# Patient Record
Sex: FEMALE | Race: OTHER | HISPANIC OR LATINO
[De-identification: names, ages, dates, MRNs, and addresses within clinical notes are randomized per-mention and may not be internally consistent; named-entity substitution may affect disease eponyms.]

---

## 2017-01-10 ENCOUNTER — MEDICATION RENEWAL (OUTPATIENT)
Age: 82
End: 2017-01-10

## 2017-02-09 ENCOUNTER — MEDICATION RENEWAL (OUTPATIENT)
Age: 82
End: 2017-02-09

## 2017-03-08 ENCOUNTER — MEDICATION RENEWAL (OUTPATIENT)
Age: 82
End: 2017-03-08

## 2017-03-10 ENCOUNTER — RX RENEWAL (OUTPATIENT)
Age: 82
End: 2017-03-10

## 2017-03-28 ENCOUNTER — APPOINTMENT (OUTPATIENT)
Dept: INTERNAL MEDICINE | Facility: CLINIC | Age: 82
End: 2017-03-28

## 2017-03-28 VITALS
TEMPERATURE: 98.2 F | HEIGHT: 65 IN | HEART RATE: 75 BPM | OXYGEN SATURATION: 96 % | DIASTOLIC BLOOD PRESSURE: 80 MMHG | SYSTOLIC BLOOD PRESSURE: 122 MMHG

## 2017-03-28 DIAGNOSIS — R42 DIZZINESS AND GIDDINESS: ICD-10-CM

## 2017-03-28 DIAGNOSIS — R06.02 SHORTNESS OF BREATH: ICD-10-CM

## 2017-03-28 DIAGNOSIS — D64.9 ANEMIA, UNSPECIFIED: ICD-10-CM

## 2017-03-28 DIAGNOSIS — I10 ESSENTIAL (PRIMARY) HYPERTENSION: ICD-10-CM

## 2017-03-28 DIAGNOSIS — E03.9 HYPOTHYROIDISM, UNSPECIFIED: ICD-10-CM

## 2017-03-29 LAB
ANION GAP SERPL CALC-SCNC: 16 MMOL/L
BASOPHILS # BLD AUTO: 0.03 K/UL
BASOPHILS NFR BLD AUTO: 0.3 %
BUN SERPL-MCNC: 17 MG/DL
CALCIUM SERPL-MCNC: 9.5 MG/DL
CHLORIDE SERPL-SCNC: 107 MMOL/L
CO2 SERPL-SCNC: 24 MMOL/L
CREAT SERPL-MCNC: 0.88 MG/DL
EOSINOPHIL # BLD AUTO: 0.12 K/UL
EOSINOPHIL NFR BLD AUTO: 1.1 %
GLUCOSE SERPL-MCNC: 148 MG/DL
HCT VFR BLD CALC: 42.6 %
HGB BLD-MCNC: 13.5 G/DL
IMM GRANULOCYTES NFR BLD AUTO: 0.2 %
LYMPHOCYTES # BLD AUTO: 1.55 K/UL
LYMPHOCYTES NFR BLD AUTO: 14 %
MAN DIFF?: NORMAL
MCHC RBC-ENTMCNC: 31.7 GM/DL
MCHC RBC-ENTMCNC: 31.9 PG
MCV RBC AUTO: 100.7 FL
MONOCYTES # BLD AUTO: 0.68 K/UL
MONOCYTES NFR BLD AUTO: 6.1 %
NEUTROPHILS # BLD AUTO: 8.71 K/UL
NEUTROPHILS NFR BLD AUTO: 78.3 %
PLATELET # BLD AUTO: 288 K/UL
POTASSIUM SERPL-SCNC: 4.5 MMOL/L
RBC # BLD: 4.23 M/UL
RBC # FLD: 13.9 %
SODIUM SERPL-SCNC: 147 MMOL/L
TSH SERPL-ACNC: 1 UIU/ML
WBC # FLD AUTO: 11.11 K/UL

## 2017-04-07 ENCOUNTER — RX RENEWAL (OUTPATIENT)
Age: 82
End: 2017-04-07

## 2017-04-12 ENCOUNTER — MEDICATION RENEWAL (OUTPATIENT)
Age: 82
End: 2017-04-12

## 2017-07-12 ENCOUNTER — MEDICATION RENEWAL (OUTPATIENT)
Age: 82
End: 2017-07-12

## 2017-10-11 ENCOUNTER — MEDICATION RENEWAL (OUTPATIENT)
Age: 82
End: 2017-10-11

## 2017-10-18 ENCOUNTER — APPOINTMENT (OUTPATIENT)
Dept: INTERNAL MEDICINE | Facility: CLINIC | Age: 82
End: 2017-10-18

## 2018-03-29 ENCOUNTER — INPATIENT (INPATIENT)
Facility: HOSPITAL | Age: 83
LOS: 7 days | Discharge: HOSPICE | DRG: 871 | End: 2018-04-06
Attending: HOSPITALIST | Admitting: INTERNAL MEDICINE
Payer: MEDICARE

## 2018-03-29 VITALS
SYSTOLIC BLOOD PRESSURE: 114 MMHG | DIASTOLIC BLOOD PRESSURE: 70 MMHG | RESPIRATION RATE: 18 BRPM | HEART RATE: 122 BPM | OXYGEN SATURATION: 94 %

## 2018-03-29 DIAGNOSIS — A41.9 SEPSIS, UNSPECIFIED ORGANISM: ICD-10-CM

## 2018-03-29 DIAGNOSIS — J18.1 LOBAR PNEUMONIA, UNSPECIFIED ORGANISM: ICD-10-CM

## 2018-03-29 DIAGNOSIS — F03.90 UNSPECIFIED DEMENTIA WITHOUT BEHAVIORAL DISTURBANCE: ICD-10-CM

## 2018-03-29 DIAGNOSIS — N17.9 ACUTE KIDNEY FAILURE, UNSPECIFIED: ICD-10-CM

## 2018-03-29 DIAGNOSIS — N39.0 URINARY TRACT INFECTION, SITE NOT SPECIFIED: ICD-10-CM

## 2018-03-29 DIAGNOSIS — R73.9 HYPERGLYCEMIA, UNSPECIFIED: ICD-10-CM

## 2018-03-29 DIAGNOSIS — G93.40 ENCEPHALOPATHY, UNSPECIFIED: ICD-10-CM

## 2018-03-29 DIAGNOSIS — E87.0 HYPEROSMOLALITY AND HYPERNATREMIA: ICD-10-CM

## 2018-03-29 DIAGNOSIS — R91.8 OTHER NONSPECIFIC ABNORMAL FINDING OF LUNG FIELD: ICD-10-CM

## 2018-03-29 DIAGNOSIS — R50.9 FEVER, UNSPECIFIED: ICD-10-CM

## 2018-03-29 LAB
ALBUMIN SERPL ELPH-MCNC: 3.1 G/DL — LOW (ref 3.3–5.2)
ALBUMIN SERPL ELPH-MCNC: 3.5 G/DL — SIGNIFICANT CHANGE UP (ref 3.3–5.2)
ALP SERPL-CCNC: 56 U/L — SIGNIFICANT CHANGE UP (ref 40–120)
ALP SERPL-CCNC: 64 U/L — SIGNIFICANT CHANGE UP (ref 40–120)
ALT FLD-CCNC: 40 U/L — HIGH
ALT FLD-CCNC: 46 U/L — HIGH
ANION GAP SERPL CALC-SCNC: 18 MMOL/L — HIGH (ref 5–17)
ANION GAP SERPL CALC-SCNC: 22 MMOL/L — HIGH (ref 5–17)
ANION GAP SERPL CALC-SCNC: 22 MMOL/L — HIGH (ref 5–17)
ANISOCYTOSIS BLD QL: SLIGHT — SIGNIFICANT CHANGE UP
APPEARANCE UR: CLEAR — SIGNIFICANT CHANGE UP
APTT BLD: 24.8 SEC — LOW (ref 27.5–37.4)
AST SERPL-CCNC: 69 U/L — HIGH
AST SERPL-CCNC: 81 U/L — HIGH
BACTERIA # UR AUTO: ABNORMAL
BILIRUB SERPL-MCNC: 0.8 MG/DL — SIGNIFICANT CHANGE UP (ref 0.4–2)
BILIRUB SERPL-MCNC: 1 MG/DL — SIGNIFICANT CHANGE UP (ref 0.4–2)
BILIRUB UR-MCNC: ABNORMAL
BUN SERPL-MCNC: 58 MG/DL — HIGH (ref 8–20)
BUN SERPL-MCNC: 63 MG/DL — HIGH (ref 8–20)
BUN SERPL-MCNC: 66 MG/DL — HIGH (ref 8–20)
CALCIUM SERPL-MCNC: 8.2 MG/DL — LOW (ref 8.6–10.2)
CALCIUM SERPL-MCNC: 8.8 MG/DL — SIGNIFICANT CHANGE UP (ref 8.6–10.2)
CALCIUM SERPL-MCNC: 9.8 MG/DL — SIGNIFICANT CHANGE UP (ref 8.6–10.2)
CHLORIDE SERPL-SCNC: 118 MMOL/L — HIGH (ref 98–107)
CHLORIDE SERPL-SCNC: 121 MMOL/L — HIGH (ref 98–107)
CHLORIDE SERPL-SCNC: 121 MMOL/L — HIGH (ref 98–107)
CO2 SERPL-SCNC: 20 MMOL/L — LOW (ref 22–29)
CO2 SERPL-SCNC: 23 MMOL/L — SIGNIFICANT CHANGE UP (ref 22–29)
CO2 SERPL-SCNC: 26 MMOL/L — SIGNIFICANT CHANGE UP (ref 22–29)
COLOR SPEC: YELLOW — SIGNIFICANT CHANGE UP
CREAT SERPL-MCNC: 2.63 MG/DL — HIGH (ref 0.5–1.3)
CREAT SERPL-MCNC: 2.67 MG/DL — HIGH (ref 0.5–1.3)
CREAT SERPL-MCNC: 2.92 MG/DL — HIGH (ref 0.5–1.3)
DIFF PNL FLD: ABNORMAL
EPI CELLS # UR: SIGNIFICANT CHANGE UP
GLUCOSE SERPL-MCNC: 142 MG/DL — HIGH (ref 70–115)
GLUCOSE SERPL-MCNC: 150 MG/DL — HIGH (ref 70–115)
GLUCOSE SERPL-MCNC: 232 MG/DL — HIGH (ref 70–115)
GLUCOSE UR QL: 50 MG/DL
HCT VFR BLD CALC: 46.9 % — SIGNIFICANT CHANGE UP (ref 37–47)
HGB BLD-MCNC: 15.6 G/DL — SIGNIFICANT CHANGE UP (ref 12–16)
HYPOCHROMIA BLD QL: SLIGHT — SIGNIFICANT CHANGE UP
INR BLD: 1.76 RATIO — HIGH (ref 0.88–1.16)
KETONES UR-MCNC: ABNORMAL
LACTATE BLDV-MCNC: 2.5 MMOL/L — HIGH (ref 0.5–2)
LEUKOCYTE ESTERASE UR-ACNC: ABNORMAL
LYMPHOCYTES # BLD AUTO: 11 % — LOW (ref 20–55)
MACROCYTES BLD QL: SLIGHT — SIGNIFICANT CHANGE UP
MAGNESIUM SERPL-MCNC: 2.1 MG/DL — SIGNIFICANT CHANGE UP (ref 1.6–2.6)
MCHC RBC-ENTMCNC: 33.3 G/DL — SIGNIFICANT CHANGE UP (ref 32–36)
MCHC RBC-ENTMCNC: 33.5 PG — HIGH (ref 27–31)
MCV RBC AUTO: 100.6 FL — HIGH (ref 81–99)
MICROCYTES BLD QL: SLIGHT — SIGNIFICANT CHANGE UP
MONOCYTES NFR BLD AUTO: 6 % — SIGNIFICANT CHANGE UP (ref 3–10)
NEUTROPHILS NFR BLD AUTO: 83 % — HIGH (ref 37–73)
NITRITE UR-MCNC: POSITIVE
OVALOCYTES BLD QL SMEAR: SLIGHT — SIGNIFICANT CHANGE UP
PH UR: 5 — SIGNIFICANT CHANGE UP (ref 5–8)
PHOSPHATE SERPL-MCNC: 3.6 MG/DL — SIGNIFICANT CHANGE UP (ref 2.4–4.7)
PLAT MORPH BLD: NORMAL — SIGNIFICANT CHANGE UP
PLATELET # BLD AUTO: 373 K/UL — SIGNIFICANT CHANGE UP (ref 150–400)
POIKILOCYTOSIS BLD QL AUTO: SLIGHT — SIGNIFICANT CHANGE UP
POTASSIUM SERPL-MCNC: 3.5 MMOL/L — SIGNIFICANT CHANGE UP (ref 3.5–5.3)
POTASSIUM SERPL-MCNC: 3.6 MMOL/L — SIGNIFICANT CHANGE UP (ref 3.5–5.3)
POTASSIUM SERPL-MCNC: 4.9 MMOL/L — SIGNIFICANT CHANGE UP (ref 3.5–5.3)
POTASSIUM SERPL-SCNC: 3.5 MMOL/L — SIGNIFICANT CHANGE UP (ref 3.5–5.3)
POTASSIUM SERPL-SCNC: 3.6 MMOL/L — SIGNIFICANT CHANGE UP (ref 3.5–5.3)
POTASSIUM SERPL-SCNC: 4.9 MMOL/L — SIGNIFICANT CHANGE UP (ref 3.5–5.3)
PROT SERPL-MCNC: 6.4 G/DL — LOW (ref 6.6–8.7)
PROT SERPL-MCNC: 7.5 G/DL — SIGNIFICANT CHANGE UP (ref 6.6–8.7)
PROT UR-MCNC: 30 MG/DL
PROTHROM AB SERPL-ACNC: 19.6 SEC — HIGH (ref 9.8–12.7)
RBC # BLD: 4.66 M/UL — SIGNIFICANT CHANGE UP (ref 4.4–5.2)
RBC # FLD: 14.7 % — SIGNIFICANT CHANGE UP (ref 11–15.6)
RBC BLD AUTO: ABNORMAL
RBC CASTS # UR COMP ASSIST: SIGNIFICANT CHANGE UP /HPF (ref 0–4)
SODIUM SERPL-SCNC: 162 MMOL/L — CRITICAL HIGH (ref 135–145)
SODIUM SERPL-SCNC: 163 MMOL/L — CRITICAL HIGH (ref 135–145)
SODIUM SERPL-SCNC: 166 MMOL/L — CRITICAL HIGH (ref 135–145)
SP GR SPEC: 1.02 — SIGNIFICANT CHANGE UP (ref 1.01–1.02)
UROBILINOGEN FLD QL: 4 MG/DL
WBC # BLD: 19.8 K/UL — HIGH (ref 4.8–10.8)
WBC # FLD AUTO: 19.8 K/UL — HIGH (ref 4.8–10.8)
WBC UR QL: SIGNIFICANT CHANGE UP

## 2018-03-29 PROCEDURE — 71045 X-RAY EXAM CHEST 1 VIEW: CPT | Mod: 26

## 2018-03-29 PROCEDURE — 93010 ELECTROCARDIOGRAM REPORT: CPT

## 2018-03-29 PROCEDURE — 99223 1ST HOSP IP/OBS HIGH 75: CPT

## 2018-03-29 PROCEDURE — 99285 EMERGENCY DEPT VISIT HI MDM: CPT

## 2018-03-29 RX ORDER — PIPERACILLIN AND TAZOBACTAM 4; .5 G/20ML; G/20ML
3.38 INJECTION, POWDER, LYOPHILIZED, FOR SOLUTION INTRAVENOUS EVERY 12 HOURS
Qty: 0 | Refills: 0 | Status: COMPLETED | OUTPATIENT
Start: 2018-03-29 | End: 2018-04-04

## 2018-03-29 RX ORDER — AZITHROMYCIN 500 MG/1
500 TABLET, FILM COATED ORAL ONCE
Qty: 0 | Refills: 0 | Status: COMPLETED | OUTPATIENT
Start: 2018-03-29 | End: 2018-03-29

## 2018-03-29 RX ORDER — SODIUM CHLORIDE 9 MG/ML
1000 INJECTION, SOLUTION INTRAVENOUS
Qty: 0 | Refills: 0 | Status: DISCONTINUED | OUTPATIENT
Start: 2018-03-29 | End: 2018-03-30

## 2018-03-29 RX ORDER — LEVOTHYROXINE SODIUM 125 MCG
12.5 TABLET ORAL DAILY
Qty: 0 | Refills: 0 | Status: DISCONTINUED | OUTPATIENT
Start: 2018-03-29 | End: 2018-04-06

## 2018-03-29 RX ORDER — BUDESONIDE AND FORMOTEROL FUMARATE DIHYDRATE 160; 4.5 UG/1; UG/1
2 AEROSOL RESPIRATORY (INHALATION)
Qty: 0 | Refills: 0 | Status: DISCONTINUED | OUTPATIENT
Start: 2018-03-29 | End: 2018-03-29

## 2018-03-29 RX ORDER — SODIUM CHLORIDE 9 MG/ML
1000 INJECTION, SOLUTION INTRAVENOUS
Qty: 0 | Refills: 0 | Status: DISCONTINUED | OUTPATIENT
Start: 2018-03-29 | End: 2018-03-29

## 2018-03-29 RX ORDER — PIPERACILLIN AND TAZOBACTAM 4; .5 G/20ML; G/20ML
3.38 INJECTION, POWDER, LYOPHILIZED, FOR SOLUTION INTRAVENOUS ONCE
Qty: 0 | Refills: 0 | Status: COMPLETED | OUTPATIENT
Start: 2018-03-29 | End: 2018-03-29

## 2018-03-29 RX ORDER — ENOXAPARIN SODIUM 100 MG/ML
30 INJECTION SUBCUTANEOUS DAILY
Qty: 0 | Refills: 0 | Status: DISCONTINUED | OUTPATIENT
Start: 2018-03-29 | End: 2018-03-29

## 2018-03-29 RX ORDER — INSULIN LISPRO 100/ML
VIAL (ML) SUBCUTANEOUS EVERY 6 HOURS
Qty: 0 | Refills: 0 | Status: DISCONTINUED | OUTPATIENT
Start: 2018-03-29 | End: 2018-04-04

## 2018-03-29 RX ORDER — ACETAMINOPHEN 500 MG
650 TABLET ORAL ONCE
Qty: 0 | Refills: 0 | Status: COMPLETED | OUTPATIENT
Start: 2018-03-29 | End: 2018-03-29

## 2018-03-29 RX ORDER — ASPIRIN/CALCIUM CARB/MAGNESIUM 324 MG
81 TABLET ORAL DAILY
Qty: 0 | Refills: 0 | Status: DISCONTINUED | OUTPATIENT
Start: 2018-03-29 | End: 2018-04-06

## 2018-03-29 RX ORDER — SODIUM CHLORIDE 9 MG/ML
2000 INJECTION INTRAMUSCULAR; INTRAVENOUS; SUBCUTANEOUS ONCE
Qty: 0 | Refills: 0 | Status: COMPLETED | OUTPATIENT
Start: 2018-03-29 | End: 2018-03-29

## 2018-03-29 RX ORDER — ALBUTEROL 90 UG/1
2.5 AEROSOL, METERED ORAL EVERY 6 HOURS
Qty: 0 | Refills: 0 | Status: DISCONTINUED | OUTPATIENT
Start: 2018-03-29 | End: 2018-04-02

## 2018-03-29 RX ORDER — SODIUM CHLORIDE 9 MG/ML
1000 INJECTION INTRAMUSCULAR; INTRAVENOUS; SUBCUTANEOUS ONCE
Qty: 0 | Refills: 0 | Status: COMPLETED | OUTPATIENT
Start: 2018-03-29 | End: 2018-03-29

## 2018-03-29 RX ORDER — QUETIAPINE FUMARATE 200 MG/1
25 TABLET, FILM COATED ORAL AT BEDTIME
Qty: 0 | Refills: 0 | Status: DISCONTINUED | OUTPATIENT
Start: 2018-03-29 | End: 2018-04-06

## 2018-03-29 RX ORDER — HEPARIN SODIUM 5000 [USP'U]/ML
5000 INJECTION INTRAVENOUS; SUBCUTANEOUS EVERY 12 HOURS
Qty: 0 | Refills: 0 | Status: DISCONTINUED | OUTPATIENT
Start: 2018-03-29 | End: 2018-04-02

## 2018-03-29 RX ORDER — HALOPERIDOL DECANOATE 100 MG/ML
3 INJECTION INTRAMUSCULAR ONCE
Qty: 0 | Refills: 0 | Status: COMPLETED | OUTPATIENT
Start: 2018-03-29 | End: 2018-03-29

## 2018-03-29 RX ADMIN — AZITHROMYCIN 255 MILLIGRAM(S): 500 TABLET, FILM COATED ORAL at 16:30

## 2018-03-29 RX ADMIN — PIPERACILLIN AND TAZOBACTAM 200 GRAM(S): 4; .5 INJECTION, POWDER, LYOPHILIZED, FOR SOLUTION INTRAVENOUS at 11:57

## 2018-03-29 RX ADMIN — SODIUM CHLORIDE 2000 MILLILITER(S): 9 INJECTION INTRAMUSCULAR; INTRAVENOUS; SUBCUTANEOUS at 11:57

## 2018-03-29 RX ADMIN — SODIUM CHLORIDE 1000 MILLILITER(S): 9 INJECTION INTRAMUSCULAR; INTRAVENOUS; SUBCUTANEOUS at 14:08

## 2018-03-29 RX ADMIN — Medication 650 MILLIGRAM(S): at 10:48

## 2018-03-29 RX ADMIN — HALOPERIDOL DECANOATE 3 MILLIGRAM(S): 100 INJECTION INTRAMUSCULAR at 14:08

## 2018-03-29 RX ADMIN — PIPERACILLIN AND TAZOBACTAM 25 GRAM(S): 4; .5 INJECTION, POWDER, LYOPHILIZED, FOR SOLUTION INTRAVENOUS at 19:48

## 2018-03-29 RX ADMIN — SODIUM CHLORIDE 80 MILLILITER(S): 9 INJECTION, SOLUTION INTRAVENOUS at 17:16

## 2018-03-29 RX ADMIN — SODIUM CHLORIDE 80 MILLILITER(S): 9 INJECTION, SOLUTION INTRAVENOUS at 20:00

## 2018-03-29 NOTE — ED ADULT NURSE NOTE - OBJECTIVE STATEMENT
family states that she has ams for several days. pt was treated for UTI 3 days ago. pt was very confused and agitated this this am. family states that she has ams for several days. pt was treated for UTI 3 days ago. pt was very confused and agitated this  am. pt also has become incont of urine which she never was. pt has multiply skin tears to bilaterally arms on arrival

## 2018-03-29 NOTE — ED PROVIDER NOTE - MUSCULOSKELETAL, MLM
Spine appears normal, range of motion is not limited, no muscle or joint tenderness. Moving all extremities. No LE edema.

## 2018-03-29 NOTE — H&P ADULT - NSHPPHYSICALEXAM_GEN_ALL_CORE
Vital Signs Last 24 Hrs  T(C): 36.6 (29 Mar 2018 14:42), Max: 38.9 (29 Mar 2018 10:25)  T(F): 97.9 (29 Mar 2018 14:42), Max: 102.1 (29 Mar 2018 10:25)  HR: 123 (29 Mar 2018 11:05) (122 - 134)  BP: 105/55 (29 Mar 2018 11:05) (105/55 - 125/75)  BP(mean): 79 (29 Mar 2018 11:05) (79 - 96)  RR: 22 (29 Mar 2018 11:05) (18 - 22)  SpO2: 100% (29 Mar 2018 11:05) (94% - 100%)    GENERAL: Not in distress. drowsy. agitated, follows verbal command intermittently. limited exam   HEENT:  Normocephalic and atraumatic. PEARLA,  NECK: Supple.  No JVD.    CARDIOVASCULAR: RRR S1, S2. No murmur/rubs/gallop  LUNGS: BLAE+, + rales, no wheezing, no rhonchi.    ABDOMEN: ND. Soft,  NT, no guarding / rebound / rigidity. BS normoactive. No CVA tenderness.    BACK: No spine tenderness.  EXTREMITIES: no cyanosis, no clubbing, no edema.   SKIN: no rash. multiple small skin break with superficial old blood. No cellulitis.  NEUROLOGIC: confused, drowsy, moving all limbs    PSYCHIATRIC: intermittent agitation however follows verbal redirection

## 2018-03-29 NOTE — ED PROVIDER NOTE - OBJECTIVE STATEMENT
This is a 89 y/o F PMHx dementia, DM, HTN and hypothyroidism BIBA to ED for altered mental status for the past three days. EMS states pt lives with family and as per them pt has been declining for the past three days. She was diagnosed with a UTI by a visiting at home nurse and failed out patient treatement with Macrobid. At baseline pt ambulates around with  walker on her own and is verbal. EMS found pt hypotensive, tachycardic and warm to the touch when they arrived. Pt is on Metoprolol, Lasix, Macrobid, Aspirin and Remeron. This is a 89 y/o F PMHx dementia, DM, HTN and hypothyroidism BIBA to ED for altered mental status for the past three days. EMS states pt lives with family and as per them pt has been declining for the past three days. She was diagnosed with a UTI by a visiting at home nurse and failed out patient treatement with Macrobid. At baseline pt ambulates around with  walker on her own and is verbal. EMS found pt hypotensive, tachycardic and warm to the touch when they arrived. Pt is on Metoprolol, Lasix, Macrobid, Aspirin and Remeron. As per he states pt is not diabetic, she is not on any diabetic medications. He also reports pt had blood drawn on the 21st, they received a call yesterday where they were informed pt had an elevated WBC. Three days ago pt also began to be incontinent which is abnormal for her. This is a 89 y/o F PMHx dementia, DM, HTN and hypothyroidism BIBA to ED for altered mental status for the past three days. EMS states pt lives with family and has been declining for the past three days after a UTI diagnosed by a visiting at home nurse. Pt was placed on Macrobid for UTI. At baseline pt is able to ambulate with a walker and is verbal. EMS found pt hypotensive, tachycardic and warm to the touch when they arrived. Pt is on Metoprolol, Lasix, Macrobid, Aspirin and Remeron. As per son he states pt is not diabetic, she is not on any diabetic medications. He also reports pt had blood drawn on the 21st, they received a call yesterday where they were informed pt had an elevated WBC. Three days ago pt also began to be incontinent which is abnormal for her.

## 2018-03-29 NOTE — ED PROVIDER NOTE - CARE PLAN
Principal Discharge DX:	Urinary tract infection without hematuria, site unspecified  Secondary Diagnosis:	Hypernatremia  Secondary Diagnosis:	Sepsis, due to unspecified organism

## 2018-03-29 NOTE — CONSULT NOTE ADULT - SUBJECTIVE AND OBJECTIVE BOX
Patient is a 89y old  Female who presents with a chief complaint of AMS, (29 Mar 2018 17:01)      BRIEF HOSPITAL COURSE: 89 y/o F with a h/o dementia, DM, HTN, hypothyroidism, recent UTI being treated with Macrobid, presents to ED today with AMS and poor PO intake. Patient found to be hypotensive, tachycardia, and febrile. UA (+). CXR suspicious for LLL infiltrate. Also noted to be in acute renal failure with profound hypernatremia (Na: 166). As per report, family states that although patient has h/o dementia, she is  able to converse and ambulate with help of a walker. Patient is now severely encephalopathic- writhing in bed, unresponsive to questions or commands, clawing at forearms and drawing blood. No seizure activity appreciated.      PAST MEDICAL & SURGICAL HISTORY:  Dementia with behavioral disturbance, unspecified dementia type  Depression  Hypothyroidism  DJD (degenerative joint disease)  Hypertension  H/O: hysterectomy    Allergies    No Known Allergies    Intolerances      FAMILY HISTORY:  No significant family history      Review of Systems: Unable to obtain secondary to mental status      Medications:  piperacillin/tazobactam IVPB. 3.375 Gram(s) IV Intermittent every 12 hours  ALBUTerol    0.083% 2.5 milliGRAM(s) Nebulizer every 6 hours PRN  QUEtiapine 25 milliGRAM(s) Oral at bedtime  aspirin  chewable 81 milliGRAM(s) Oral daily  heparin  Injectable 5000 Unit(s) SubCutaneous every 12 hours  levothyroxine 12.5 MICROGram(s) Oral daily  dextrose 5%. 1000 milliLiter(s) IV Continuous <Continuous>      ICU Vital Signs Last 24 Hrs  T(C): 36.6 (29 Mar 2018 21:04), Max: 38.9 (29 Mar 2018 10:25)  T(F): 97.8 (29 Mar 2018 21:04), Max: 102.1 (29 Mar 2018 10:25)  HR: 120 (29 Mar 2018 21:04) (120 - 134)  BP: 103/85 (29 Mar 2018 21:04) (103/85 - 125/75)  BP(mean): 79 (29 Mar 2018 11:05) (79 - 96)  ABP: --  ABP(mean): --  RR: 22 (29 Mar 2018 21:04) (18 - 22)  SpO2: 95% (29 Mar 2018 21:04) (94% - 100%)    Vital Signs Last 24 Hrs  T(C): 36.6 (29 Mar 2018 21:04), Max: 38.9 (29 Mar 2018 10:25)  T(F): 97.8 (29 Mar 2018 21:04), Max: 102.1 (29 Mar 2018 10:25)  HR: 120 (29 Mar 2018 21:04) (120 - 134)  BP: 103/85 (29 Mar 2018 21:04) (103/85 - 125/75)  BP(mean): 79 (29 Mar 2018 11:05) (79 - 96)  RR: 22 (29 Mar 2018 21:04) (18 - 22)  SpO2: 95% (29 Mar 2018 21:04) (94% - 100%)        I&O's Detail        LABS:                        15.6   19.8  )-----------( 373      ( 29 Mar 2018 10:43 )             46.9     03-29    162<HH>  |  121<H>  |  58.0<H>  ----------------------------<  232<H>  3.5   |  23.0  |  2.63<H>    Ca    8.2<L>      29 Mar 2018 19:03  Phos  3.6     -29  Mg     2.1     -29    TPro  6.4<L>  /  Alb  3.1<L>  /  TBili  1.0  /  DBili  x   /  AST  69<H>  /  ALT  40<H>  /  AlkPhos  56  03-29          CAPILLARY BLOOD GLUCOSE        PT/INR - ( 29 Mar 2018 19:03 )   PT: 19.6 sec;   INR: 1.76 ratio         PTT - ( 29 Mar 2018 19:03 )  PTT:24.8 sec  Urinalysis Basic - ( 29 Mar 2018 11:31 )    Color: Yellow / Appearance: Clear / S.025 / pH: x  Gluc: x / Ketone: Trace  / Bili: Moderate / Urobili: 4 mg/dL   Blood: x / Protein: 30 mg/dL / Nitrite: Positive   Leuk Esterase: Trace / RBC: 0-2 /HPF / WBC 0-2   Sq Epi: x / Non Sq Epi: Few / Bacteria: Moderate      CULTURES:      Physical Examination:    General: severe distress and encephalopathy, writhing in bed    HEENT: Pupils equal, reactive to light.  Symmetric.    PULM: Clear to auscultation bilaterally, no significant sputum production    CVS: tachycardic, regular rhythm, no murmurs, rubs, or gallops    ABD: Soft, nondistended, nontender, normoactive bowel sounds, no masses    EXT: No edema, nontender    SKIN: Warm and well perfused, no rashes noted.    NEURO: A&Ox0, severe encephalopathy, unresponsive to questions/commands, moving all extremities spontaneously      RADIOLOGY:     < from: Xray Chest 1 View-PORTABLE IMMEDIATE (18 @ 12:56) >  FINDINGS:   The lungs  show left lower lobe retrocardiac airspace consolidation   and/or mass. Left upper lobe and right lung parenchyma clear.       Follow-up chest radiograph and/or lateral radiograph recommended.  The heart and mediastinum are within normal limits.         Visualized osseous structures are intact.    IMPRESSION:   Left lower lobe retrocardiac airspace consolidation and/or   mass..          CRITICAL CARE TIME SPENT: 51 mins  Evaluating/treating patient, reviewing data/labs/imaging, discussing case with multidisciplinary team, discussing plan/goals of care with patient/family. Non-inclusive of procedure time.

## 2018-03-29 NOTE — CONSULT NOTE ADULT - SUBJECTIVE AND OBJECTIVE BOX
[facility]  =======================================================  Joon Gomez MD [facility]   Reese Solorio MD  Diplomates American Board of Internal Medicine and Infectious Diseases  =======================================================    [id number]  DARREL PHILLIP   This is a 89y  Female with dementia, DM, HTN and hypothyroidism, BIBA to ED for altered mental status for the past three days. Pt recently found with UTI by visiting RN and given a course of Macrobid.  patient now brought to the ER for evalation.   found febrile to 102. WBC elevation to 19k.  and hypernatremia to 166.    She is found with acute renal failure.  Her UA is also positive.   patient cannot contribute any history.   CXR shows infiltrate in the LLL / retrocardiac space.   Blood cx x 2 drawn; pt started on Zosyn by admitting team    =======================================================  Past Medical & Surgical Hx:  =====================  PAST MEDICAL & SURGICAL HISTORY:  Dementia with behavioral disturbance, unspecified dementia type  Depression  Hypothyroidism  DJD (degenerative joint disease)  Hypertension  H/O: hysterectomy      Problem List:  ==========  HEALTH ISSUES - PROBLEM Dx:          Social Hx:  =======  no toxic habits currently    Family History:  no significant family history of immunosuppressive disorders.  FAMILY HISTORY:  No significant family history         Allergies    No Known Allergies    Intolerances        MEDICATIONS  (STANDING):  aspirin  chewable 81 milliGRAM(s) Oral daily  buDESOnide 160 MICROgram(s)/formoterol 4.5 MICROgram(s) Inhaler 2 Puff(s) Inhalation two times a day  dextrose 5%. 1000 milliLiter(s) (80 mL/Hr) IV Continuous <Continuous>  enoxaparin Injectable 30 milliGRAM(s) SubCutaneous daily  levothyroxine 12.5 MICROGram(s) Oral daily  piperacillin/tazobactam IVPB. 3.375 Gram(s) IV Intermittent every 12 hours  QUEtiapine 25 milliGRAM(s) Oral at bedtime    MEDICATIONS  (PRN):  ALBUTerol    0.083% 2.5 milliGRAM(s) Nebulizer every 6 hours PRN SOB        =======================================================  REVIEW OF SYSTEMS:  UNABLE TO OBTAIN DUE TO MEDICAL STATUS    ======================================================  Physical Exam:  ============  Vital Signs Last 24 Hrs  T(C): 36.6 (29 Mar 2018 14:42), Max: 38.9 (29 Mar 2018 10:25)  T(F): 97.9 (29 Mar 2018 14:42), Max: 102.1 (29 Mar 2018 10:25)  HR: 123 (29 Mar 2018 11:05) (122 - 134)  BP: 105/55 (29 Mar 2018 11:05) (105/55 - 125/75)  BP(mean): 79 (29 Mar 2018 11:05) (79 - 96)  RR: 22 (29 Mar 2018 11:05) (18 - 22)  SpO2: 100% (29 Mar 2018 11:05) (94% - 100%)  Height (cm): 157.5 ( @ 15:48)  Weight (kg): 60 ( @ 15:48)  BMI (kg/m2): 24.2 ( @ 15:48)  BSA (m2): 1.6 ( @ 15:48)    General: THIN FRAIL LETHARGIC  Eye: Pupils are equal, round and reactive to light,   HENT: Normocephalic, Oral mucosa is VERY DRY, COBBLESTONING ON TONGUE  Neck: Supple,    Respiratory: COARSE UPPER AIRWAY RHONCHI, POOR BASE AERATION  Cardiovascular: Normal rate, TACHYCARDIA; NO EDEMA  Gastrointestinal: Soft, Non-distended, Normal bowel sounds.  Genitourinary: NO PIEDRA IN PLACE  Musculoskeletal: MOVES ALL EXTREMITIES  Integumentary: No rash.  Neurologic: Cranial Nerves II-XII are grossly intact. NON VERBAL CURRENTLY  Psychiatric: UNABLE TO ASSESS      =======================================================  Labs:  ====      163<HH>  |  121<H>  |  63.0<H>  ----------------------------<  150<H>  3.6   |  20.0<L>  |  2.67<H>    Ca    8.8      29 Mar 2018 13:47    TPro  6.4<L>  /  Alb  3.1<L>  /  TBili  1.0  /  DBili  x   /  AST  69<H>  /  ALT  40<H>  /  AlkPhos  56                            15.6   19.8  )-----------( 373      ( 29 Mar 2018 10:43 )             46.9         Urinalysis Basic - ( 29 Mar 2018 11:31 )    Color: Yellow / Appearance: Clear / S.025 / pH: x  Gluc: x / Ketone: Trace  / Bili: Moderate / Urobili: 4 mg/dL   Blood: x / Protein: 30 mg/dL / Nitrite: Positive   Leuk Esterase: Trace / RBC: 0-2 /HPF / WBC 0-2   Sq Epi: x / Non Sq Epi: Few / Bacteria: Moderate      LIVER FUNCTIONS - ( 29 Mar 2018 13:47 )  Alb: 3.1 g/dL / Pro: 6.4 g/dL / ALK PHOS: 56 U/L / ALT: 40 U/L / AST: 69 U/L / GGT: x

## 2018-03-29 NOTE — H&P ADULT - HISTORY OF PRESENT ILLNESS
This is a 89 y/o F PMHx dementia, DM, HTN and hypothyroidism, BIBA to ED for altered mental status for the past three days. EMS states pt lives with family and has been declining for the past three days after a UTI diagnosed by a visiting at home nurse. Pt was placed on Macrobid for UTI. At baseline pt is able to ambulate with a walker and is verbal. EMS found pt hypotensive, tachycardic and warm to the touch when they arrived. Pt is on Metoprolol, Lasix, Macrobid, Aspirin and Remeron. As per son he states pt is not diabetic, she is not on any diabetic medications. He also reports pt had blood drawn on the 21st, they received a call yesterday where they were informed pt had an elevated WBC. Three days ago pt also began to be incontinent which is abnormal for her. history verified from her son and NOK Mr. Hitesh Perera over phone who has been her caretaker for years. as per son, patient ambulates with walker at baseline and sometimes get agitated.she is currently not herself.

## 2018-03-29 NOTE — H&P ADULT - PMH
Dementia with behavioral disturbance, unspecified dementia type    Depression    DJD (degenerative joint disease)    Hypertension    Hypothyroidism

## 2018-03-29 NOTE — ED ADULT NURSE NOTE - ED STAT RN HANDOFF DETAILS
report given to janel gupta per policy, pt stable for tansport with 1:1 in plce on cm with , safety maintained

## 2018-03-29 NOTE — H&P ADULT - NSHPLABSRESULTS_GEN_ALL_CORE
15.6   19.8  )-----------( 373      ( 29 Mar 2018 10:43 )             46.9         03-29    163<HH>  |  121<H>  |  63.0<H>  ----------------------------<  150<H>  3.6   |  20.0<L>  |  2.67<H>    Ca    8.8      29 Mar 2018 13:47    TPro  6.4<L>  /  Alb  3.1<L>  /  TBili  1.0  /  DBili  x   /  AST  69<H>  /  ALT  40<H>  /  AlkPhos  56  03-29      < from: Xray Chest 1 View-PORTABLE IMMEDIATE (03.29.18 @ 12:56) >    IMPRESSION:   Left lower lobe retrocardiac airspace consolidation and/or   mass..          < end of copied text >

## 2018-03-29 NOTE — ED ADULT TRIAGE NOTE - CHIEF COMPLAINT QUOTE
pt was recently treated for a uti with macrobid  pt altered as per family code sepsis initaed upon arrival   dr sainz at bedside at 1019

## 2018-03-29 NOTE — ED ADULT NURSE NOTE - ED STAT RN HANDOFF WHERE
sicu bed 19, pt in sicu bed 19 when logistics called this rn to say pt was now going to MICU, pt to remain in sicu bed 19 per logistics

## 2018-03-29 NOTE — ED PROVIDER NOTE - UNABLE TO OBTAIN
History limited secondary to dementia and altered mental status. Dementia Unable to obtain review of systems secondary to dementia and altered mental status.

## 2018-03-29 NOTE — CONSULT NOTE ADULT - SUBJECTIVE AND OBJECTIVE BOX
History of Present Illness:  		          89 y/o female sent in from her rehab as her labs were abn. and she has not been eating or drinking well. pt's daughter is at bed side who is giving most of the history. pt. denies abd. pain. no n/v/d. pt. has been making urine. no cough/ phlegm. no fever in ED. no cp. no sob. pt. was recently discharged from [facility] after she was admitted for colitis. pt's cipro was stopped at Rehab after her Cr was found to be elevated.    Allergies/Medications:     	No Known Allergies:     Home Medications:     · 	metroNIDAZOLE 250 mg oral tablet: 1 tab(s) orally 3 times a day  · 	ciprofloxacin 250 mg oral tablet: 1 tab(s) orally 2 times a day  · 	hydrALAZINE: 25 milligram(s) orally 2 times a day  · 	ramipril 10 mg oral capsule:  orally   · 	traMADol:  orally   · 	amLODIPine 5 mg oral tablet:  orally   · 	metoprolol 50 mg oral tablet:  orally   · 	Synthroid 50 mcg (0.05 mg) oral tablet:  orally   · 	mirtazapine 30 mg oral tablet:  orally   · 	Dulera 5 mcg-200 mcg/inh inhalation aerosol:  inhaled       Past Medical History:  Depression    DJD (degenerative joint disease)    Hypertension    Hypothyroidism.    Past Surgical History:  H/O: hysterectomy.    Family History:  No significant family history.      Review of Systems:  · Additional ROS	ROS is negative except as in in HPI,	       Vital Signs Flow sheet:     Vital Signs Adult    20-Aug-2014 23:39	  Authored By: pca/gerald savage	  Temp (F) Temp (F): 98.1	  Temp (C) Temp (C): 36.7	  BP Systolic Systolic: 112	  BP Diastolic Diastolic (mm Hg): 65	  Respiration Rate (breaths/min) Respiration Rate (breaths/min): 16	  SpO2 (%) SpO2 (%): 99	  Patient On: supplemental O2	    Physical Exam:  · Constitutional	detailed exam	  · Constitutional Comments	elderly female lying in bed in NAD, appears dehydrated. Non Verbal, 	  · Eyes	EOMI; PERRL; no drainage or redness	  · ENMT	detailed exam	  · ENMT Comments	oral mucosa and lips appear dry otherwise unremarkable.	  · Neck	No bruits; no thyromegaly or nodules	  · Respiratory	Breath Sounds equal & clear to percussion & auscultation, no accessory muscle use	  · Cardiovascular	Regular rate & rhythm, normal S1, S2; no murmurs, gallops or rubs; no S3, S4	  · Gastrointestinal	Soft, non-tender, no hepatosplenomegaly, normal bowel sounds	  		  		  · Extremities	No cyanosis, clubbing or edema	  · Neurological	Lethargic,  no Focal defects,	  · Skin	detailed exam	  · Skin Details	warm and dry; no obvious rash noted,	      General Chemistry:	    20-Aug-2014 22:16, Comprehensive Metabolic Panel	  Sodium, Serum: 141, [136 - 145 mmol/L]	  Potassium, Serum: 4.3, [3.5 - 5.1 mmol/L], Moderate hemolysis.  Results may be falsely elevated.	  Chloride, Serum: 101, [98 - 107 mmol/L]	  Carbon Dioxide, Serum: 25.0, [22.0 - 29.0 mmol/L]	  Anion Gap, Serum: 15, [5 - 17 mmol/L]	  Blood Urea Nitrogen, Serum:    39.0, [8.0 - 20.0 mg/dL]	  Creatinine, Serum:    5.98, [0.50 - 1.50 mg/dL]	  Glucose, Serum: 108, [70 - 115 mg/dL]	  Calcium, Total Serum:    8.0, [8.6 - 10.2 mg/dL]	  Protein Total, Serum:    6.0, [6.6 - 8.7 g/dL]	  Albumin, Serum:    2.9, [3.3 - 5.2 g/dL]	  Bilirubin Total, Serum:    0.3, [0.4 - 2.0 mg/dL]	  Alkaline Phosphatase, Serum: 58, [30 - 120 U/L]	  Aspartate Aminotransferase (AST/SGOT):    35, [ - <=31 U/L]	  Alanine Aminotransferase (ALT/SGPT): 8, [ - <=32 U/L]	    20-Aug-2014 22:16, Magnesium, Serum	  Magnesium, Serum: 2.2, [1.8 - 2.5 mg/dL], Moderate hemolysis.  Results may be falsely elevated.	    20-Aug-2014 22:16, Phosphorus Level, Serum	  Phosphorus Level, Serum: 4.1, [2.4 - 4.7 mg/dL]	  Hematology:	    20-Aug-2014 20:47, Complete Blood Count + Automated Diff	  WBC Count:    15.74, [4.80 - 10.80 K/uL]	  RBC Count:    3.92, [4.40 - 5.20 M/uL]	  Hemoglobin: 12.1, [12.0 - 16.0 g/dL]	  Hematocrit:    36.3, [37.0 - 47.0 %]	  Mean Cell Volume: 92.6, [81.0 - 99.0 fl]	  Mean Cell Hemoglobin: 30.9, [27.0 - 31.0 pg]	  Mean Cell Hemoglobin Conc: 33.3, [32.0 - 36.0 g/dL]	  Red Cell Distrib Width: 14.2, [11.0 - 15.6 %]	  Platelet Count - Automated: 299, [150 - 400 K/uL]	  Auto Neutrophil #:    12.9, [1.8 - 8.0 K/uL]	  Auto Lymphocyte #: 1.3, [1.0 - 4.8 K/uL]	  Auto Monocyte #:    1.3, [0.0 - 0.8 K/uL]	  Auto Eosinophil #: 0.1, [0.0 - 0.5 K/uL]	  Auto Basophil #: 0.0, [0.0 - 0.2 K/uL]	  Auto Neutrophil %:    82.1, [37.0 - 73.0 %]	  Auto Lymphocyte %:    8.4, [20.0 - 55.0 %]	  Auto Monocyte %: 8.3, [3.0 - 10.0 %]	  Auto Eosinophil %: 0.5, [0.0 - 6.0 %]	  Auto Basophil %: 0.3, [0.0 - 2.0 %]	    · Radiology Results and Interpretation	chest xray shows no obvious infiltrate.	    · EKG and Interpretation	NSR .no acute changes.	    Assessment and Plan:     Problem/Plan - 1:  · 	Problem: 1.  ARF (acute renal failure) - Pre Renal w. Hypovolemia & Hypertonic Dehydration,    Plan:Hypotonic  iv fluid. d/c ramipril . Trend SNa+,     Problem/Plan - 2:  · 	Problem: 2.  Dehydration.     Plan: iv fluids. wbc slightly elevated. pt. does not look septic, blood culture and urine culture ,    Problem/Plan - 3:  · 	Problem: 3.  Hypertension.     Plan: monitor closely. d/c ramipril.

## 2018-03-29 NOTE — ED ADULT NURSE REASSESSMENT NOTE - NS ED NURSE REASSESS COMMENT FT1
Bedside report received from offgoing rn, chart as noted, ekg performed by soledad calhoun, pt on tele box and , 1:1 in place for pt safety, pt a&ox0 not following commands, pulling @ lines and non compliant with nc for o2 administration, #20g iv noted to left wrist, site asymp, medicated per md orders, pending icu bed order s@ this time, safety maintained, will continue to monitor and reassess

## 2018-03-29 NOTE — ED ADULT NURSE REASSESSMENT NOTE - NS ED NURSE REASSESS COMMENT FT1
Pt remains in ED for longer than 60minutes s/p admission time d/t no bed in ICu per logistics, charge rn aware

## 2018-03-29 NOTE — CONSULT NOTE ADULT - PROBLEM SELECTOR RECOMMENDATION 6
Continue empiric antibiotic therapy. Sputum culture added. Aspiration precautions given AMS. Supplemental O2 PRN to maintain SaO2 > 92%.

## 2018-03-29 NOTE — H&P ADULT - ASSESSMENT
This is a 89 y/o F PMHx dementia, DM, HTN and hypothyroidism, BIBA to ED for altered mental status for the past three days. EMS states pt lives with family and has been declining for the past three days after a UTI diagnosed by a visiting at home nurse. Pt was placed on Macrobid for UTI. At baseline pt is able to ambulate with a walker and is verbal. EMS found pt hypotensive, tachycardic and warm to the touch when they arrived. Pt is on Metoprolol, Lasix, Macrobid, Aspirin and Remeron. As per son he states pt is not diabetic, she is not on any diabetic medications. He also reports pt had blood drawn on the 21st, they received a call yesterday where they were informed pt had an elevated WBC. Three days ago pt also began to be incontinent which is abnormal for her. history verified from her son and [facility] Mr. Hitesh Perera over phone who has been her caretaker for years. as per son, patient ambulates with walker at baseline and sometimes get agitated.she is currently not herself.     a/p:    metabolic encephalopathy; sepsis due to PNA and UTI  LLL PNA due to infectious organism  s/o zosyn and zithromax  c/w IV abx  f/u BC and UC  check urine legionella  IVF  aspiraiton precaution  admit to telemetry    GRAY: possibly related to sepsis/dehydration  seen by renal  on IVF  monitor RF  strict ins and out    hypokalemia: replaced  hypernatremia  monitor electrolytes  check repeat BMP and Mg  seizure precaution    HTN: BP lower side and patient septic  anti-HTN on hold  monitor BP and start anti-HTN gradually    Tachycardia: possibly related to dehydration,fever,sepsis, hypotension  c/w IVF  restart metoprolol once BP stable    dementia with agitation  c/w Seroquel hols mirtazepine and introduce as per clinical status allows.   fall precaution  1:1 for safety    skin laceration with bleeding from multiple sites: no cellulitis  very fragile skin  son reported she was on coumadin in the remote past for clot and skin laceration was started  daily dressing [ wet to dry,non adherent dressing]      DVT: SCD. lovenox from tomorrow    end of life: patient is full code now. needs palliative eval This is a 89 y/o F PMHx dementia, DM, HTN and hypothyroidism, BIBA to ED for altered mental status for the past three days. EMS states pt lives with family and has been declining for the past three days after a UTI diagnosed by a visiting at home nurse. Pt was placed on Macrobid for UTI. At baseline pt is able to ambulate with a walker and is verbal. EMS found pt hypotensive, tachycardic and warm to the touch when they arrived. Pt is on Metoprolol, Lasix, Macrobid, Aspirin and Remeron. As per son he states pt is not diabetic, she is not on any diabetic medications. He also reports pt had blood drawn on the 21st, they received a call yesterday where they were informed pt had an elevated WBC. Three days ago pt also began to be incontinent which is abnormal for her. history verified from her son and [facility] Mr. Hitesh Perera over phone who has been her caretaker for years. as per son, patient ambulates with walker at baseline and sometimes get agitated.she is currently not herself.     a/p:    metabolic encephalopathy; sepsis due to PNA and UTI  LLL PNA due to infectious organism  s/o zosyn and zithromax  c/w IV abx  f/u BC and UC  check urine legionella  IVF  aspiraiton precaution  admit to telemetry    GRAY: possibly related to sepsis/dehydration  seen by renal  on IVF  monitor RF and electrolytes  strict ins and out    hypokalemia: replaced  hypernatremia  monitor electrolytes  check repeat BMP and Mg  seizure precaution    HTN: BP lower side and patient septic  anti-HTN on hold  monitor BP and start anti-HTN gradually    Tachycardia: possibly related to dehydration,fever,sepsis, hypotension  c/w IVF  restart metoprolol once BP stable    dementia with agitation  c/w Seroquel hols mirtazepine and introduce as per clinical status allows.   fall precaution  1:1 for safety    skin laceration with bleeding from multiple sites: no cellulitis  very fragile skin  son reported she was on coumadin in the remote past for clot and skin laceration was started  daily dressing [ wet to dry,non adherent dressing]      DVT: SCD. lovenox from tomorrow    end of life: patient is full code now. needs palliative eval    Dispo: patient is currently being admitted to telemetry. however as per ID patient needs ICU eval due to AMS, electrolytes abnormality and sepsis and needs to repeat electrolytes Q6hrs. MICU evaluation was called This is a 89 y/o F PMHx dementia, DM, HTN and hypothyroidism, BIBA to ED for altered mental status for the past three days. EMS states pt lives with family and has been declining for the past three days after a UTI diagnosed by a visiting at home nurse. Pt was placed on Macrobid for UTI. At baseline pt is able to ambulate with a walker and is verbal. EMS found pt hypotensive, tachycardic and warm to the touch when they arrived. Pt is on Metoprolol, Lasix, Macrobid, Aspirin and Remeron. As per son he states pt is not diabetic, she is not on any diabetic medications. He also reports pt had blood drawn on the 21st, they received a call yesterday where they were informed pt had an elevated WBC. Three days ago pt also began to be incontinent which is abnormal for her. history verified from her son and [facility] Mr. Hitesh Perera over phone who has been her caretaker for years. as per son, patient ambulates with walker at baseline and sometimes get agitated.she is currently not herself.     a/p:    metabolic encephalopathy; sepsis due to PNA and UTI  LLL PNA due to infectious organism  s/o zosyn and zithromax  c/w IV abx  f/u BC and UC  check urine legionella  IVF  aspiraiton precaution  admit to telemetry    GRAY: possibly related to sepsis/dehydration  seen by renal  on IVF  monitor RF and electrolytes  strict ins and out    hypokalemia: replaced  hypernatremia: on IVF  monitor electrolytes  check repeat BMP and Mg   seizure precaution    HTN: BP lower side and patient septic  anti-HTN on hold  monitor BP and start anti-HTN gradually    Tachycardia: possibly related to dehydration,fever,sepsis, hypotension  c/w IVF  restart metoprolol once BP stable    dementia with agitation  c/w Seroquel hols mirtazepine and introduce as per clinical status allows.   fall precaution  1:1 for safety    skin laceration with bleeding from multiple sites: no cellulitis  very fragile skin  son reported she was on coumadin in the remote past for clot and skin laceration was started  daily dressing [ wet to dry,non adherent dressing]      DVT: SCD. lovenox from tomorrow    end of life: patient is full code now. needs palliative eval    Dispo: patient is currently being admitted to telemetry. however as per ID patient needs ICU eval due to AMS, electrolytes abnormality and sepsis and needs to repeat electrolytes Q6hrs. MICU evaluation was called This is a 89 y/o F PMHx dementia, DM, HTN and hypothyroidism, BIBA to ED for altered mental status for the past three days. EMS states pt lives with family and has been declining for the past three days after a UTI diagnosed by a visiting at home nurse. Pt was placed on Macrobid for UTI. At baseline pt is able to ambulate with a walker and is verbal. EMS found pt hypotensive, tachycardic and warm to the touch when they arrived. Pt is on Metoprolol, Lasix, Macrobid, Aspirin and Remeron. As per son he states pt is not diabetic, she is not on any diabetic medications. He also reports pt had blood drawn on the 21st, they received a call yesterday where they were informed pt had an elevated WBC. Three days ago pt also began to be incontinent which is abnormal for her. history verified from her son and [facility] Mr. Hitesh Perera over phone who has been her caretaker for years. as per son, patient ambulates with walker at baseline and sometimes get agitated.she is currently not herself.     a/p:    metabolic encephalopathy; sepsis due to PNA and UTI  LLL PNA due to infectious organism  s/o zosyn and zithromax  c/w IV abx  f/u BC and UC  check urine legionella  IVF  aspiraiton precaution  admit to telemetry    GRAY: possibly related to sepsis/dehydration  seen by renal  on IVF  monitor RF and electrolytes  strict ins and out    hypokalemia: replaced  hypernatremia: on IVF  monitor electrolytes  check repeat BMP and Mg,  need close monitoring  of electrolytes   seizure precaution    HTN: BP lower side and patient septic  anti-HTN on hold  monitor BP and start anti-HTN gradually    Tachycardia: possibly related to dehydration,fever,sepsis, hypotension  c/w IVF  restart metoprolol once BP stable    dementia with agitation  c/w Seroquel hols mirtazepine and introduce as per clinical status allows.   fall precaution  1:1 for safety    skin laceration with bleeding from multiple sites: no cellulitis  very fragile skin  son reported she was on coumadin in the remote past for clot and skin laceration was started  daily dressing [ wet to dry,non adherent dressing]      DVT: SCD. lovenox from tomorrow    end of life: patient is full code now. needs palliative eval    Dispo: patient is currently being admitted to telemetry. however as per ID patient needs ICU eval due to AMS, electrolytes abnormality and sepsis and needs to repeat electrolytes Q6hrs. agreed and MICU evaluation was called This is a 89 y/o F PMHx dementia, DM, HTN and hypothyroidism, BIBA to ED for altered mental status for the past three days. EMS states pt lives with family and has been declining for the past three days after a UTI diagnosed by a visiting at home nurse. Pt was placed on Macrobid for UTI. At baseline pt is able to ambulate with a walker and is verbal. EMS found pt hypotensive, tachycardic and warm to the touch when they arrived. Pt is on Metoprolol, Lasix, Macrobid, Aspirin and Remeron. As per son he states pt is not diabetic, she is not on any diabetic medications. He also reports pt had blood drawn on the 21st, they received a call yesterday where they were informed pt had an elevated WBC. Three days ago pt also began to be incontinent which is abnormal for her. history verified from her son and [facility] Mr. Hitesh Perera over phone who has been her caretaker for years. as per son, patient ambulates with walker at baseline and sometimes get agitated.she is currently not herself.     a/p:    metabolic encephalopathy; could be related to sepsis due to PNA and UTI, hypernatremia  LLL PNA due to infectious organism  s/o zosyn and zithromax  c/w IV abx  f/u BC and UC  check urine legionella  IVF  aspiraiton precaution  admited to telemtery    GRAY: possibly related to sepsis/dehydration  seen by renal  on IVF  monitor RF and electrolytes  strict ins and out    hypokalemia: replaced  hypernatremia: on IVF  monitor electrolytes  check repeat BMP and Mg,  need close monitoring  of electrolytes   seizure precaution    HTN: BP lower side and patient septic  anti-HTN on hold  monitor BP and start anti-HTN gradually    Tachycardia: possibly related to dehydration,fever,sepsis, hypotension  c/w IVF  restart metoprolol once BP stable    dementia with agitation  c/w Seroquel hols mirtazepine and introduce as per clinical status allows.   fall precaution  1:1 for safety    skin laceration with bleeding from multiple sites: no cellulitis  very fragile skin  son reported she was on coumadin in the remote past for clot and skin laceration was started  daily dressing [ wet to dry,non adherent dressing]      DVT: SCD. lovenox from tomorrow    end of life: patient is full code now. needs palliative eval    Dispo: patient is currently being admitted to telemetry. however as per ID patient needs ICU eval due to AMS, electrolytes abnormality and sepsis and needs to repeat electrolytes Q6hrs. agreed and MICU evaluation was called

## 2018-03-30 DIAGNOSIS — N30.00 ACUTE CYSTITIS WITHOUT HEMATURIA: ICD-10-CM

## 2018-03-30 DIAGNOSIS — A41.9 SEPSIS, UNSPECIFIED ORGANISM: ICD-10-CM

## 2018-03-30 DIAGNOSIS — F03.91 UNSPECIFIED DEMENTIA WITH BEHAVIORAL DISTURBANCE: ICD-10-CM

## 2018-03-30 LAB
-  COAGULASE NEGATIVE STAPHYLOCOCCUS: SIGNIFICANT CHANGE UP
ANION GAP SERPL CALC-SCNC: 13 MMOL/L — SIGNIFICANT CHANGE UP (ref 5–17)
ANION GAP SERPL CALC-SCNC: 15 MMOL/L — SIGNIFICANT CHANGE UP (ref 5–17)
APPEARANCE UR: ABNORMAL
BACTERIA # UR AUTO: ABNORMAL
BILIRUB UR-MCNC: NEGATIVE — SIGNIFICANT CHANGE UP
BUN SERPL-MCNC: 34 MG/DL — HIGH (ref 8–20)
BUN SERPL-MCNC: 57 MG/DL — HIGH (ref 8–20)
CALCIUM SERPL-MCNC: 6.8 MG/DL — LOW (ref 8.6–10.2)
CALCIUM SERPL-MCNC: 8.1 MG/DL — LOW (ref 8.6–10.2)
CHLORIDE SERPL-SCNC: 112 MMOL/L — HIGH (ref 98–107)
CHLORIDE SERPL-SCNC: 118 MMOL/L — HIGH (ref 98–107)
CO2 SERPL-SCNC: 21 MMOL/L — LOW (ref 22–29)
CO2 SERPL-SCNC: 23 MMOL/L — SIGNIFICANT CHANGE UP (ref 22–29)
COLOR SPEC: YELLOW — SIGNIFICANT CHANGE UP
CREAT SERPL-MCNC: 1.03 MG/DL — SIGNIFICANT CHANGE UP (ref 0.5–1.3)
CREAT SERPL-MCNC: 2.36 MG/DL — HIGH (ref 0.5–1.3)
DIFF PNL FLD: ABNORMAL
EPI CELLS # UR: SIGNIFICANT CHANGE UP
GLUCOSE BLDC GLUCOMTR-MCNC: 124 MG/DL — HIGH (ref 70–99)
GLUCOSE BLDC GLUCOMTR-MCNC: 128 MG/DL — HIGH (ref 70–99)
GLUCOSE BLDC GLUCOMTR-MCNC: 169 MG/DL — HIGH (ref 70–99)
GLUCOSE BLDC GLUCOMTR-MCNC: 175 MG/DL — HIGH (ref 70–99)
GLUCOSE BLDC GLUCOMTR-MCNC: 220 MG/DL — HIGH (ref 70–99)
GLUCOSE SERPL-MCNC: 142 MG/DL — HIGH (ref 70–115)
GLUCOSE SERPL-MCNC: 161 MG/DL — HIGH (ref 70–115)
GLUCOSE UR QL: NEGATIVE MG/DL — SIGNIFICANT CHANGE UP
GRAM STN FLD: SIGNIFICANT CHANGE UP
HBA1C BLD-MCNC: 5.4 % — SIGNIFICANT CHANGE UP (ref 4–5.6)
HCT VFR BLD CALC: 39.9 % — SIGNIFICANT CHANGE UP (ref 37–47)
HGB BLD-MCNC: 13.1 G/DL — SIGNIFICANT CHANGE UP (ref 12–16)
KETONES UR-MCNC: ABNORMAL
LACTATE BLDV-MCNC: 1.9 MMOL/L — SIGNIFICANT CHANGE UP (ref 0.5–2)
LEUKOCYTE ESTERASE UR-ACNC: ABNORMAL
MAGNESIUM SERPL-MCNC: 2 MG/DL — SIGNIFICANT CHANGE UP (ref 1.6–2.6)
MAGNESIUM SERPL-MCNC: 2.1 MG/DL — SIGNIFICANT CHANGE UP (ref 1.6–2.6)
MCHC RBC-ENTMCNC: 32.8 G/DL — SIGNIFICANT CHANGE UP (ref 32–36)
MCHC RBC-ENTMCNC: 32.9 PG — HIGH (ref 27–31)
MCV RBC AUTO: 100.3 FL — HIGH (ref 81–99)
METHOD TYPE: SIGNIFICANT CHANGE UP
NITRITE UR-MCNC: NEGATIVE — SIGNIFICANT CHANGE UP
OSMOLALITY UR: 513 MOSM/KG — SIGNIFICANT CHANGE UP (ref 300–1000)
PH UR: 5 — SIGNIFICANT CHANGE UP (ref 5–8)
PHOSPHATE SERPL-MCNC: 1.6 MG/DL — LOW (ref 2.4–4.7)
PHOSPHATE SERPL-MCNC: 2.9 MG/DL — SIGNIFICANT CHANGE UP (ref 2.4–4.7)
PLATELET # BLD AUTO: 235 K/UL — SIGNIFICANT CHANGE UP (ref 150–400)
POTASSIUM SERPL-MCNC: 3 MMOL/L — LOW (ref 3.5–5.3)
POTASSIUM SERPL-MCNC: 3 MMOL/L — LOW (ref 3.5–5.3)
POTASSIUM SERPL-SCNC: 3 MMOL/L — LOW (ref 3.5–5.3)
POTASSIUM SERPL-SCNC: 3 MMOL/L — LOW (ref 3.5–5.3)
PROT UR-MCNC: 30 MG/DL
RBC # BLD: 3.98 M/UL — LOW (ref 4.4–5.2)
RBC # FLD: 14.8 % — SIGNIFICANT CHANGE UP (ref 11–15.6)
RBC CASTS # UR COMP ASSIST: SIGNIFICANT CHANGE UP /HPF (ref 0–4)
SODIUM SERPL-SCNC: 146 MMOL/L — HIGH (ref 135–145)
SODIUM SERPL-SCNC: 156 MMOL/L — HIGH (ref 135–145)
SODIUM UR-SCNC: 33 MMOL/L — SIGNIFICANT CHANGE UP
SP GR SPEC: 1.02 — SIGNIFICANT CHANGE UP (ref 1.01–1.02)
SPECIMEN SOURCE: SIGNIFICANT CHANGE UP
T3 SERPL-MCNC: 67 NG/DL — LOW (ref 80–200)
T4 AB SER-ACNC: 4.8 UG/DL — SIGNIFICANT CHANGE UP (ref 4.5–12)
TSH SERPL-MCNC: 0.33 UIU/ML — SIGNIFICANT CHANGE UP (ref 0.27–4.2)
URATE CRY FLD QL MICRO: ABNORMAL
UROBILINOGEN FLD QL: NEGATIVE MG/DL — SIGNIFICANT CHANGE UP
WBC # BLD: 16.4 K/UL — HIGH (ref 4.8–10.8)
WBC # FLD AUTO: 16.4 K/UL — HIGH (ref 4.8–10.8)
WBC UR QL: SIGNIFICANT CHANGE UP

## 2018-03-30 PROCEDURE — 71045 X-RAY EXAM CHEST 1 VIEW: CPT | Mod: 26,77

## 2018-03-30 PROCEDURE — 99233 SBSQ HOSP IP/OBS HIGH 50: CPT

## 2018-03-30 PROCEDURE — 71045 X-RAY EXAM CHEST 1 VIEW: CPT | Mod: 26

## 2018-03-30 PROCEDURE — 99291 CRITICAL CARE FIRST HOUR: CPT

## 2018-03-30 RX ORDER — DEXMEDETOMIDINE HYDROCHLORIDE IN 0.9% SODIUM CHLORIDE 4 UG/ML
0.3 INJECTION INTRAVENOUS
Qty: 200 | Refills: 0 | Status: DISCONTINUED | OUTPATIENT
Start: 2018-03-30 | End: 2018-03-31

## 2018-03-30 RX ORDER — HYDROCORTISONE 20 MG
50 TABLET ORAL EVERY 6 HOURS
Qty: 0 | Refills: 0 | Status: DISCONTINUED | OUTPATIENT
Start: 2018-03-30 | End: 2018-04-01

## 2018-03-30 RX ORDER — ASCORBIC ACID 60 MG
1500 TABLET,CHEWABLE ORAL EVERY 6 HOURS
Qty: 0 | Refills: 0 | Status: DISCONTINUED | OUTPATIENT
Start: 2018-03-30 | End: 2018-04-03

## 2018-03-30 RX ORDER — ADENOSINE 3 MG/ML
6 INJECTION INTRAVENOUS ONCE
Qty: 0 | Refills: 0 | Status: COMPLETED | OUTPATIENT
Start: 2018-03-30 | End: 2018-03-30

## 2018-03-30 RX ORDER — FENTANYL CITRATE 50 UG/ML
100 INJECTION INTRAVENOUS ONCE
Qty: 0 | Refills: 0 | Status: DISCONTINUED | OUTPATIENT
Start: 2018-03-30 | End: 2018-03-30

## 2018-03-30 RX ORDER — CHLORHEXIDINE GLUCONATE 213 G/1000ML
15 SOLUTION TOPICAL
Qty: 0 | Refills: 0 | Status: DISCONTINUED | OUTPATIENT
Start: 2018-03-30 | End: 2018-03-31

## 2018-03-30 RX ORDER — THIAMINE MONONITRATE (VIT B1) 100 MG
200 TABLET ORAL
Qty: 0 | Refills: 0 | Status: COMPLETED | OUTPATIENT
Start: 2018-03-30 | End: 2018-03-30

## 2018-03-30 RX ORDER — FENTANYL CITRATE 50 UG/ML
50 INJECTION INTRAVENOUS ONCE
Qty: 0 | Refills: 0 | Status: DISCONTINUED | OUTPATIENT
Start: 2018-03-30 | End: 2018-03-30

## 2018-03-30 RX ORDER — METOPROLOL TARTRATE 50 MG
5 TABLET ORAL ONCE
Qty: 0 | Refills: 0 | Status: COMPLETED | OUTPATIENT
Start: 2018-03-30 | End: 2018-03-30

## 2018-03-30 RX ORDER — VANCOMYCIN HCL 1 G
1000 VIAL (EA) INTRAVENOUS ONCE
Qty: 0 | Refills: 0 | Status: COMPLETED | OUTPATIENT
Start: 2018-03-30 | End: 2018-03-30

## 2018-03-30 RX ORDER — POTASSIUM CHLORIDE 20 MEQ
20 PACKET (EA) ORAL
Qty: 0 | Refills: 0 | Status: COMPLETED | OUTPATIENT
Start: 2018-03-30 | End: 2018-03-31

## 2018-03-30 RX ORDER — MIDODRINE HYDROCHLORIDE 2.5 MG/1
10 TABLET ORAL THREE TIMES A DAY
Qty: 0 | Refills: 0 | Status: DISCONTINUED | OUTPATIENT
Start: 2018-03-30 | End: 2018-03-31

## 2018-03-30 RX ORDER — POTASSIUM PHOSPHATE, MONOBASIC POTASSIUM PHOSPHATE, DIBASIC 236; 224 MG/ML; MG/ML
30 INJECTION, SOLUTION INTRAVENOUS ONCE
Qty: 0 | Refills: 0 | Status: COMPLETED | OUTPATIENT
Start: 2018-03-30 | End: 2018-03-30

## 2018-03-30 RX ORDER — PANTOPRAZOLE SODIUM 20 MG/1
40 TABLET, DELAYED RELEASE ORAL DAILY
Qty: 0 | Refills: 0 | Status: DISCONTINUED | OUTPATIENT
Start: 2018-03-30 | End: 2018-03-31

## 2018-03-30 RX ORDER — POTASSIUM CHLORIDE 20 MEQ
10 PACKET (EA) ORAL
Qty: 0 | Refills: 0 | Status: COMPLETED | OUTPATIENT
Start: 2018-03-30 | End: 2018-03-30

## 2018-03-30 RX ORDER — PHENYLEPHRINE HYDROCHLORIDE 10 MG/ML
0.3 INJECTION INTRAVENOUS
Qty: 80 | Refills: 0 | Status: DISCONTINUED | OUTPATIENT
Start: 2018-03-30 | End: 2018-03-31

## 2018-03-30 RX ORDER — SODIUM CHLORIDE 9 MG/ML
1000 INJECTION, SOLUTION INTRAVENOUS ONCE
Qty: 0 | Refills: 0 | Status: COMPLETED | OUTPATIENT
Start: 2018-03-30 | End: 2018-03-30

## 2018-03-30 RX ORDER — ETOMIDATE 2 MG/ML
12 INJECTION INTRAVENOUS ONCE
Qty: 0 | Refills: 0 | Status: COMPLETED | OUTPATIENT
Start: 2018-03-30 | End: 2018-03-30

## 2018-03-30 RX ORDER — NOREPINEPHRINE BITARTRATE/D5W 8 MG/250ML
0.05 PLASTIC BAG, INJECTION (ML) INTRAVENOUS
Qty: 8 | Refills: 0 | Status: DISCONTINUED | OUTPATIENT
Start: 2018-03-30 | End: 2018-03-30

## 2018-03-30 RX ORDER — SODIUM CHLORIDE 9 MG/ML
1000 INJECTION, SOLUTION INTRAVENOUS
Qty: 0 | Refills: 0 | Status: DISCONTINUED | OUTPATIENT
Start: 2018-03-30 | End: 2018-03-31

## 2018-03-30 RX ADMIN — PANTOPRAZOLE SODIUM 40 MILLIGRAM(S): 20 TABLET, DELAYED RELEASE ORAL at 12:22

## 2018-03-30 RX ADMIN — Medication 50 MILLIGRAM(S): at 23:29

## 2018-03-30 RX ADMIN — Medication 50 MILLIGRAM(S): at 12:22

## 2018-03-30 RX ADMIN — CHLORHEXIDINE GLUCONATE 15 MILLILITER(S): 213 SOLUTION TOPICAL at 17:23

## 2018-03-30 RX ADMIN — FENTANYL CITRATE 100 MICROGRAM(S): 50 INJECTION INTRAVENOUS at 06:45

## 2018-03-30 RX ADMIN — QUETIAPINE FUMARATE 25 MILLIGRAM(S): 200 TABLET, FILM COATED ORAL at 21:48

## 2018-03-30 RX ADMIN — FENTANYL CITRATE 50 MICROGRAM(S): 50 INJECTION INTRAVENOUS at 05:50

## 2018-03-30 RX ADMIN — Medication 102 MILLIGRAM(S): at 23:28

## 2018-03-30 RX ADMIN — Medication 5.62 MICROGRAM(S)/KG/MIN: at 06:10

## 2018-03-30 RX ADMIN — PIPERACILLIN AND TAZOBACTAM 25 GRAM(S): 4; .5 INJECTION, POWDER, LYOPHILIZED, FOR SOLUTION INTRAVENOUS at 17:36

## 2018-03-30 RX ADMIN — FENTANYL CITRATE 100 MICROGRAM(S): 50 INJECTION INTRAVENOUS at 06:30

## 2018-03-30 RX ADMIN — Medication 2: at 01:11

## 2018-03-30 RX ADMIN — HEPARIN SODIUM 5000 UNIT(S): 5000 INJECTION INTRAVENOUS; SUBCUTANEOUS at 17:24

## 2018-03-30 RX ADMIN — Medication 103 MILLIGRAM(S): at 23:41

## 2018-03-30 RX ADMIN — Medication 5 MILLIGRAM(S): at 05:30

## 2018-03-30 RX ADMIN — PIPERACILLIN AND TAZOBACTAM 25 GRAM(S): 4; .5 INJECTION, POWDER, LYOPHILIZED, FOR SOLUTION INTRAVENOUS at 07:07

## 2018-03-30 RX ADMIN — MIDODRINE HYDROCHLORIDE 10 MILLIGRAM(S): 2.5 TABLET ORAL at 21:48

## 2018-03-30 RX ADMIN — Medication 103 MILLIGRAM(S): at 17:24

## 2018-03-30 RX ADMIN — DEXMEDETOMIDINE HYDROCHLORIDE IN 0.9% SODIUM CHLORIDE 4.5 MICROGRAM(S)/KG/HR: 4 INJECTION INTRAVENOUS at 08:21

## 2018-03-30 RX ADMIN — FENTANYL CITRATE 50 MICROGRAM(S): 50 INJECTION INTRAVENOUS at 05:55

## 2018-03-30 RX ADMIN — ETOMIDATE 12 MILLIGRAM(S): 2 INJECTION INTRAVENOUS at 06:00

## 2018-03-30 RX ADMIN — Medication 81 MILLIGRAM(S): at 12:22

## 2018-03-30 RX ADMIN — Medication 4: at 12:23

## 2018-03-30 RX ADMIN — Medication 250 MILLIGRAM(S): at 10:06

## 2018-03-30 RX ADMIN — Medication 50 MILLIGRAM(S): at 17:37

## 2018-03-30 RX ADMIN — Medication 2: at 17:36

## 2018-03-30 RX ADMIN — Medication 102 MILLIGRAM(S): at 13:26

## 2018-03-30 RX ADMIN — FENTANYL CITRATE 50 MICROGRAM(S): 50 INJECTION INTRAVENOUS at 06:10

## 2018-03-30 RX ADMIN — Medication 103 MILLIGRAM(S): at 13:26

## 2018-03-30 RX ADMIN — SODIUM CHLORIDE 2000 MILLILITER(S): 9 INJECTION, SOLUTION INTRAVENOUS at 15:30

## 2018-03-30 RX ADMIN — Medication 50 MILLIEQUIVALENT(S): at 23:00

## 2018-03-30 RX ADMIN — SODIUM CHLORIDE 80 MILLILITER(S): 9 INJECTION, SOLUTION INTRAVENOUS at 17:23

## 2018-03-30 RX ADMIN — FENTANYL CITRATE 50 MICROGRAM(S): 50 INJECTION INTRAVENOUS at 06:05

## 2018-03-30 RX ADMIN — Medication 100 MILLIEQUIVALENT(S): at 09:14

## 2018-03-30 RX ADMIN — ADENOSINE 6 MILLIGRAM(S): 3 INJECTION INTRAVENOUS at 06:00

## 2018-03-30 RX ADMIN — SODIUM CHLORIDE 125 MILLILITER(S): 9 INJECTION, SOLUTION INTRAVENOUS at 18:40

## 2018-03-30 RX ADMIN — Medication 100 MILLIEQUIVALENT(S): at 10:06

## 2018-03-30 RX ADMIN — PHENYLEPHRINE HYDROCHLORIDE 6.75 MICROGRAM(S)/KG/MIN: 10 INJECTION INTRAVENOUS at 06:07

## 2018-03-30 RX ADMIN — HEPARIN SODIUM 5000 UNIT(S): 5000 INJECTION INTRAVENOUS; SUBCUTANEOUS at 07:07

## 2018-03-30 RX ADMIN — Medication 100 MILLIEQUIVALENT(S): at 08:21

## 2018-03-30 NOTE — PROCEDURE NOTE - NSTRACHPOSTINTU_RESP_A_CORE
Appropriate capnography/Breath sounds equal/Positive end tidal Co2 noted/Chest X-Ray/Chest excursion noted/Breath sounds bilateral

## 2018-03-30 NOTE — PROVIDER CONTACT NOTE (EICU) - BACKGROUND
called for tachycardia a-fib to 120-130, hypotension to 70s/40s, using accessory muscles. Called family who wish all aggressive measures. I recommended intubation for worsening shock and respiratory failure. L sided PNA is possible source, Urine is another possible source.

## 2018-03-30 NOTE — PROVIDER CONTACT NOTE (OTHER) - SITUATION
Supervisor is made aware of lack of tube feedings pumps, supervision is searching. At this time, I cannot start tube feedings.

## 2018-03-30 NOTE — PROVIDER CONTACT NOTE (EICU) - ASSESSMENT
premedicated with fent 50 mcg, started vasopressor infusion, 1 L fluid wide open. Intubated by [facility] Rudolph on first attempt with DL using etomidate as induction agent. Tube advanced to 21 at lip line confirmed by ETCO2, Tube frost B/L breath sounds. and resaturation to 100%

## 2018-03-30 NOTE — PROGRESS NOTE ADULT - SUBJECTIVE AND OBJECTIVE BOX
Patient is a 89y old  Female who presents with a chief complaint of AMS, (30 Mar 2018 01:47)      BRIEF HOSPITAL COURSE:     Events last 24 hours: ***    PAST MEDICAL & SURGICAL HISTORY:  Dementia with behavioral disturbance, unspecified dementia type  Depression  Hypothyroidism  DJD (degenerative joint disease)  Hypertension  H/O: hysterectomy      Review of Systems:  CONSTITUTIONAL: No fever, chills, or fatigue  EYES: No eye pain, visual disturbances, or discharge  ENMT:  No difficulty hearing, tinnitus, vertigo; No sinus or throat pain  NECK: No pain or stiffness  RESPIRATORY: No cough, wheezing, chills or hemoptysis; No shortness of breath  CARDIOVASCULAR: No chest pain, palpitations, dizziness, or leg swelling  GASTROINTESTINAL: No abdominal or epigastric pain. No nausea, vomiting, or hematemesis; No diarrhea or constipation. No melena or hematochezia.  GENITOURINARY: No dysuria, frequency, hematuria, or incontinence  NEUROLOGICAL: No headaches, memory loss, loss of strength, numbness, or tremors  SKIN: No itching, burning, rashes, or lesions   MUSCULOSKELETAL: No joint pain or swelling; No muscle, back, or extremity pain  PSYCHIATRIC: No depression, anxiety, mood swings, or difficulty sleeping      Medications:  piperacillin/tazobactam IVPB. 3.375 Gram(s) IV Intermittent every 12 hours    norepinephrine Infusion 0.05 MICROgram(s)/kG/Min IV Continuous <Continuous>  phenylephrine    Infusion 0.3 MICROgram(s)/kG/Min IV Continuous <Continuous>    ALBUTerol    0.083% 2.5 milliGRAM(s) Nebulizer every 6 hours PRN    dexmedetomidine Infusion 0.3 MICROgram(s)/kG/Hr IV Continuous <Continuous>  QUEtiapine 25 milliGRAM(s) Oral at bedtime      aspirin  chewable 81 milliGRAM(s) Oral daily  heparin  Injectable 5000 Unit(s) SubCutaneous every 12 hours    pantoprazole  Injectable 40 milliGRAM(s) IV Push daily      hydrocortisone sodium succinate Injectable 50 milliGRAM(s) IV Push every 6 hours  insulin lispro (HumaLOG) corrective regimen sliding scale   SubCutaneous every 6 hours  levothyroxine 12.5 MICROGram(s) Oral daily    dextrose 5%. 1000 milliLiter(s) IV Continuous <Continuous>  thiamine IVPB 200 milliGRAM(s) IV Intermittent <User Schedule>      chlorhexidine 0.12% Liquid 15 milliLiter(s) Swish and Spit two times a day        Mode: CPAP with PS  FiO2: 50  PEEP: 5  PS: 5  MAP: 7.3      ICU Vital Signs Last 24 Hrs  T(C): 36.6 (30 Mar 2018 08:27), Max: 38 (29 Mar 2018 13:08)  T(F): 97.8 (30 Mar 2018 08:27), Max: 100.4 (29 Mar 2018 13:08)  HR: 50 (30 Mar 2018 11:15) (50 - 229)  BP: 111/68 (30 Mar 2018 11:00) (7/- - 167/99)  BP(mean): 96 (30 Mar 2018 10:41) (51 - 127)  ABP: --  ABP(mean): --  RR: 17 (30 Mar 2018 11:15) (13 - 31)  SpO2: 100% (30 Mar 2018 11:15) (93% - 100%)          I&O's Detail    29 Mar 2018 07:01  -  30 Mar 2018 07:00  --------------------------------------------------------  IN:    dextrose 5%.: 560 mL    norepinephrine Infusion: 9 mL    phenylephrine   Infusion: 50 mL  Total IN: 619 mL    OUT:    Indwelling Catheter - Urethral: 200 mL  Total OUT: 200 mL    Total NET: 419 mL      30 Mar 2018 07:01  -  30 Mar 2018 11:49  --------------------------------------------------------  IN:    dexmedetomidine Infusion: 24.4 mL    dextrose 5%.: 320 mL    phenylephrine   Infusion: 87.4 mL    Solution: 50 mL    Solution: 300 mL    Solution: 250 mL  Total IN: 1031.8 mL    OUT:    Indwelling Catheter - Urethral: 185 mL  Total OUT: 185 mL    Total NET: 846.8 mL            LABS:                        13.1   16.4  )-----------( 235      ( 30 Mar 2018 05:31 )             39.9     03-30    156<H>  |  118<H>  |  57.0<H>  ----------------------------<  161<H>  3.0<L>   |  23.0  |  2.36<H>    Ca    8.1<L>      30 Mar 2018 05:31  Phos  2.9     03-30  Mg     2.1     30    TPro  6.4<L>  /  Alb  3.1<L>  /  TBili  1.0  /  DBili  x   /  AST  69<H>  /  ALT  40<H>  /  AlkPhos  56            CAPILLARY BLOOD GLUCOSE      POCT Blood Glucose.: 124 mg/dL (30 Mar 2018 07:12)    PT/INR - ( 29 Mar 2018 19:03 )   PT: 19.6 sec;   INR: 1.76 ratio         PTT - ( 29 Mar 2018 19:03 )  PTT:24.8 sec  Urinalysis Basic - ( 30 Mar 2018 05:49 )    Color: Yellow / Appearance: Slightly Turbid / S.020 / pH: x  Gluc: x / Ketone: Trace  / Bili: Negative / Urobili: Negative mg/dL   Blood: x / Protein: 30 mg/dL / Nitrite: Negative   Leuk Esterase: Small / RBC: 0-2 /HPF / WBC 3-5   Sq Epi: x / Non Sq Epi: Occasional / Bacteria: Occasional      CULTURES:  Culture Results:   Growth in aerobic and anaerobic bottles: Gram Positive Cocci in Clusters  Aerobic Bottle: 20:08 Hours to positivity  Anaerobic Bottle: 21:08 Hours to positivity  ***Blood Panel PCR results on this specimen are available  approximately 3 hours afterthe Gram stain result.***  Gram stain, PCR, and/or culture results may not always  correspond due to difference in methodologies.  ************************************************************  This PCR assay was performed using [employer].  The following targets are tested for: Enterococcus,  vancomycin resistant enterococci, Listeria monocytogenes,  coagulase negative staphylococci, S. aureus,  methicillin resistant S. aureus, Streptococcus agalactiae  (Group B), S. pneumoniae, S. pyogenes (Group A),  Acinetobacter baumannii, Enterobacter cloacae, E. coli,  Klebsiella oxytoca, K. pneumoniae, Proteus sp.,  Serratia marcescens, Haemophilus influenzae,  Neisseria meningitidis, Pseudomonas aeruginosa, Candida  albicans, C. glabrata, C krusei, C parapsilosis,  C. tropicalis and the KPC resistance gene.  "Due to technical problems, Proteus sp. will Not be reported as part of  the BCID panel until further notice"  ,  TYPE: (C=Critical, N=Notification, A=Abnormal) c  TESTS:  _ gs  DATE/TIME CALLED: _ 2018 08:52:17  CALLED TO: Charly anthony rn  READ BACK (2 Patient Identifiers)(Y/N): _ y  READ BACK VALUES (Y/N): _ y  CALLED BY: Charly otto (18 @ 10:43)      Physical Examination:    General: No acute distress.  Alert, oriented, interactive, nonfocal    HEENT: Pupils equal, reactive to light.  Symmetric.    PULM: Clear to auscultation bilaterally, no significant sputum production    CVS: Regular rate and rhythm, no murmurs, rubs, or gallops    ABD: Soft, nondistended, nontender, normoactive bowel sounds, no masses    EXT: No edema, nontender    SKIN: Warm and well perfused, no rashes noted.    RADIOLOGY: ***    CRITICAL CARE TIME SPENT: *** Patient is a 89y old  Female who presents with a chief complaint of AMS, (30 Mar 2018 01:47)      BRIEF HOSPITAL COURSE: This is a 89 y/o F PMHx dementia, DM, HTN and hypothyroidism, BIBA to ED for altered mental status for the past three days. EMS states pt lives with family and has been declining for the past three days after a UTI diagnosed by a visiting at home nurse. Pt was placed on Macrobid for UTI. At baseline pt is able to ambulate with a walker and is verbal. EMS found pt hypotensive, tachycardic and warm to the touch when they arrived. Pt is on Metoprolol, Lasix, Macrobid, Aspirin and Remeron. As per son he states pt is not diabetic, she is not on any diabetic medications. He also reports pt had blood drawn on the , they received a call yesterday where they were informed pt had an elevated WBC. Three days ago pt also began to be incontinent which is abnormal for her. history verified from her son and [facility] Mr. Hitesh Perera over phone who has been her caretaker for years. as per son, patient ambulates with walker at baseline and sometimes get agitated.she is currently not herself.     Events last 24 hours: Intubated overnight in septic shock    PAST MEDICAL & SURGICAL HISTORY:  Dementia with behavioral disturbance, unspecified dementia type  Depression  Hypothyroidism  DJD (degenerative joint disease)  Hypertension  H/O: hysterectomy      Review of Systems:  unable to obtain      Medications:  piperacillin/tazobactam IVPB. 3.375 Gram(s) IV Intermittent every 12 hours    norepinephrine Infusion 0.05 MICROgram(s)/kG/Min IV Continuous <Continuous>  phenylephrine    Infusion 0.3 MICROgram(s)/kG/Min IV Continuous <Continuous>    ALBUTerol    0.083% 2.5 milliGRAM(s) Nebulizer every 6 hours PRN    dexmedetomidine Infusion 0.3 MICROgram(s)/kG/Hr IV Continuous <Continuous>  QUEtiapine 25 milliGRAM(s) Oral at bedtime      aspirin  chewable 81 milliGRAM(s) Oral daily  heparin  Injectable 5000 Unit(s) SubCutaneous every 12 hours    pantoprazole  Injectable 40 milliGRAM(s) IV Push daily      hydrocortisone sodium succinate Injectable 50 milliGRAM(s) IV Push every 6 hours  insulin lispro (HumaLOG) corrective regimen sliding scale   SubCutaneous every 6 hours  levothyroxine 12.5 MICROGram(s) Oral daily    dextrose 5%. 1000 milliLiter(s) IV Continuous <Continuous>  thiamine IVPB 200 milliGRAM(s) IV Intermittent <User Schedule>      chlorhexidine 0.12% Liquid 15 milliLiter(s) Swish and Spit two times a day        Mode: CPAP with PS  FiO2: 50  PEEP: 5  PS: 5  MAP: 7.3      ICU Vital Signs Last 24 Hrs  T(C): 36.6 (30 Mar 2018 08:27), Max: 38 (29 Mar 2018 13:08)  T(F): 97.8 (30 Mar 2018 08:27), Max: 100.4 (29 Mar 2018 13:08)  HR: 50 (30 Mar 2018 11:15) (50 - 229)  BP: 111/68 (30 Mar 2018 11:00) (7/- - 167/99)  BP(mean): 96 (30 Mar 2018 10:41) (51 - 127)  ABP: --  ABP(mean): --  RR: 17 (30 Mar 2018 11:15) (13 - 31)  SpO2: 100% (30 Mar 2018 11:15) (93% - 100%)          I&O's Detail    29 Mar 2018 07:01  -  30 Mar 2018 07:00  --------------------------------------------------------  IN:    dextrose 5%.: 560 mL    norepinephrine Infusion: 9 mL    phenylephrine   Infusion: 50 mL  Total IN: 619 mL    OUT:    Indwelling Catheter - Urethral: 200 mL  Total OUT: 200 mL    Total NET: 419 mL      30 Mar 2018 07:01  -  30 Mar 2018 11:49  --------------------------------------------------------  IN:    dexmedetomidine Infusion: 24.4 mL    dextrose 5%.: 320 mL    phenylephrine   Infusion: 87.4 mL    Solution: 50 mL    Solution: 300 mL    Solution: 250 mL  Total IN: 1031.8 mL    OUT:    Indwelling Catheter - Urethral: 185 mL  Total OUT: 185 mL    Total NET: 846.8 mL            LABS:                        13.1   16.4  )-----------( 235      ( 30 Mar 2018 05:31 )             39.9         156<H>  |  118<H>  |  57.0<H>  ----------------------------<  161<H>  3.0<L>   |  23.0  |  2.36<H>    Ca    8.1<L>      30 Mar 2018 05:31  Phos  2.9       Mg     2.1         TPro  6.4<L>  /  Alb  3.1<L>  /  TBili  1.0  /  DBili  x   /  AST  69<H>  /  ALT  40<H>  /  AlkPhos  56            CAPILLARY BLOOD GLUCOSE      POCT Blood Glucose.: 124 mg/dL (30 Mar 2018 07:12)    PT/INR - ( 29 Mar 2018 19:03 )   PT: 19.6 sec;   INR: 1.76 ratio         PTT - ( 29 Mar 2018 19:03 )  PTT:24.8 sec  Urinalysis Basic - ( 30 Mar 2018 05:49 )    Color: Yellow / Appearance: Slightly Turbid / S.020 / pH: x  Gluc: x / Ketone: Trace  / Bili: Negative / Urobili: Negative mg/dL   Blood: x / Protein: 30 mg/dL / Nitrite: Negative   Leuk Esterase: Small / RBC: 0-2 /HPF / WBC 3-5   Sq Epi: x / Non Sq Epi: Occasional / Bacteria: Occasional      CULTURES:  Culture Results:   Growth in aerobic and anaerobic bottles: Gram Positive Cocci in Clusters  Aerobic Bottle: 20:08 Hours to positivity  Anaerobic Bottle: 21:08 Hours to positivity  ***Blood Panel PCR results on this specimen are available  approximately 3 hours afterthe Gram stain result.***  Gram stain, PCR, and/or culture results may not always  correspond due to difference in methodologies.  ************************************************************  This PCR assay was performed using [employer].  The following targets are tested for: Enterococcus,  vancomycin resistant enterococci, Listeria monocytogenes,  coagulase negative staphylococci, S. aureus,  methicillin resistant S. aureus, Streptococcus agalactiae  (Group B), S. pneumoniae, S. pyogenes (Group A),  Acinetobacter baumannii, Enterobacter cloacae, E. coli,  Klebsiella oxytoca, K. pneumoniae, Proteus sp.,  Serratia marcescens, Haemophilus influenzae,  Neisseria meningitidis, Pseudomonas aeruginosa, Candida  albicans, C. glabrata, C krusei, C parapsilosis,  C. tropicalis and the KPC resistance gene.  "Due to technical problems, Proteus sp. will Not be reported as part of  the BCID panel until further notice"  ,  TYPE: (C=Critical, N=Notification, A=Abnormal) c  TESTS:  _ gs  DATE/TIME CALLED: _ 2018 08:52:17  CALLED TO: Charly anthony rn  READ BACK (2 Patient Identifiers)(Y/N): _ y  READ BACK VALUES (Y/N): _ y  CALLED BY: Charly otto (18 @ 10:43)      Physical Examination:    General: No acute distress.  intubated and sedated    HEENT: Pupils equal, reactive to light.  Symmetric.    PULM: Course    CVS: Regular rate and rhythm, no murmurs, rubs, or gallops    ABD: Soft, nondistended, nontender, normoactive bowel sounds, no masses    EXT: + edema, nontender, anasacrca    SKIN: Warm and well perfused, no rashes noted.    RADIOLOGY: < from: Xray Chest 1 View-PORTABLE IMMEDIATE (18 @ 07:36) >  EXAM:  XR CHEST PORTABLE IMMED 1V                          PROCEDURE DATE:  2018          INTERPRETATION:  Exam Date: 3/30/2018 7:36 AM    Chest radiograph (one view)         CLINICAL INFORMATION:  CVC placement    TECHNIQUE:  Single frontalview of the chest was obtained.    COMPARISON: Radiograph same day    FINDINGS/  IMPRESSION:      Tubes and lines are unchanged.    No focal consolidation. No significant pleural effusions.   Cardiomediastinal silhouette is intact.      AGNES SANDHU M.D., ATTENDING RADIOLOGIST  This document has been electronically signed. Mar 30 2018  9:14    < end of copied text >      CRITICAL CARE TIME SPENT: 45 mins, family updated by PA this am

## 2018-03-30 NOTE — PROGRESS NOTE ADULT - SUBJECTIVE AND OBJECTIVE BOX
Called to bedside by RN for SVT HR > 230 bpm. Upon my arrival, SICU PA attempting carotid massage with no improvement in HR. 's, regular, narrow complex. Patient hemodynamically stable. Zoll pads placed. Adenosine 6mg IVP given. HR slowed to AFib with RVR 's. Lopressor 5mg IVP given, HR improved to 120-140's. Patient subsequently became hypotensive with BP 70/40's, remained tachycardic 110-120's, with paradoxical respirations. Patient's son Hesham Perera called, and goals of care were discussed, and he ultimately requested that patient be intubated. Patient was urgently intubated, IV push Phenylephrine given for persistent hypotension. 1L IV fluid bolus plasma-lyte given. Patient started on Levophed as only emergently available vasopressor, which was slowly titrated in the setting of pre-existing tachycardia, and ordered to transition to Phenylephrine. Case discussed with eICU Attending Dr. Wilcox.       CC: 60 minutes non-inclusive of procedures performed

## 2018-03-30 NOTE — PROVIDER CONTACT NOTE (OTHER) - SITUATION
ID ordered 2 sets of Blood cutlures and dayshift RN could not obtain the specimen and called lab, I tried as well, Lab was unsuccessful.

## 2018-03-30 NOTE — PROGRESS NOTE ADULT - SUBJECTIVE AND OBJECTIVE BOX
[facility]  INFECTIOUS DISEASES AND INTERNAL MEDICINE at [place]  =======================================================  Joon Solorio MD  Diplomates American Board of Internal Medicine and Infectious Diseases  =======================================================    DARREL PHILLIP [id number]  chief complaint:  follow up on UTI, possible PNA  patient seen and examined in follow up.  Chart and labs reviewed.   patient hypotensive and resp distress, intubated overnight. in SICU now on pressors    =======================================================  Allergies:  No Known Allergies      =======================================================  Medications:  ALBUTerol    0.083% 2.5 milliGRAM(s) Nebulizer every 6 hours PRN  ascorbic acid IVPB 1500 milliGRAM(s) IV Intermittent every 6 hours  aspirin  chewable 81 milliGRAM(s) Oral daily  chlorhexidine 0.12% Liquid 15 milliLiter(s) Swish and Spit two times a day  dexmedetomidine Infusion 0.3 MICROgram(s)/kG/Hr IV Continuous <Continuous>  dextrose 5%. 1000 milliLiter(s) IV Continuous <Continuous>  heparin  Injectable 5000 Unit(s) SubCutaneous every 12 hours  hydrocortisone sodium succinate Injectable 50 milliGRAM(s) IV Push every 6 hours  insulin lispro (HumaLOG) corrective regimen sliding scale   SubCutaneous every 6 hours  levothyroxine 12.5 MICROGram(s) Oral daily  midodrine 10 milliGRAM(s) Oral three times a day  pantoprazole  Injectable 40 milliGRAM(s) IV Push daily  phenylephrine    Infusion 0.3 MICROgram(s)/kG/Min IV Continuous <Continuous>  piperacillin/tazobactam IVPB. 3.375 Gram(s) IV Intermittent every 12 hours  QUEtiapine 25 milliGRAM(s) Oral at bedtime  thiamine IVPB 200 milliGRAM(s) IV Intermittent <User Schedule>       =======================================================     REVIEW OF SYSTEMS:  Unable to obtain.     =======================================================    Physical Exam:    Vital Signs Last 24 Hrs  T(C): 36.4 (30 Mar 2018 16:00), Max: 37 (30 Mar 2018 04:00)  T(F): 97.5 (30 Mar 2018 16:00), Max: 98.6 (30 Mar 2018 04:00)  HR: 58 (30 Mar 2018 16:00) (50 - 229)  BP: 111/68 (30 Mar 2018 11:00) (7/- - 167/99)  BP(mean): 96 (30 Mar 2018 10:41) (51 - 127)  RR: 16 (30 Mar 2018 16:00) (11 - 31)  SpO2: 100% (30 Mar 2018 16:00) (93% - 100%)      General: THIN FRAIL LETHARGIC; intubated  Eye:   HENT: Normocephalic, ET tube in place  Neck: Supple,  left side TLC catheter.   Respiratory: COARSE UPPER AIRWAY RHONCHI, POOR BASE AERATION  Cardiovascular: Normal rate, TACHYCARDIA; NO EDEMA  Gastrointestinal: Soft, Non-distended, Normal bowel sounds.  Genitourinary: NO PIEDRA IN PLACE  Musculoskeletal: Withdraws EXTREMITIES to noxious stimuli  Integumentary: No rash.  Neurologic: Cranial Nerves II-XII are grossly intact. NON VERBAL CURRENTLY  Psychiatric: UNABLE TO ASSESS        =======================================================    Labs:      156<H>  |  118<H>  |  57.0<H>  ----------------------------<  161<H>  3.0<L>   |  23.0  |  2.36<H>    Ca    8.1<L>      30 Mar 2018 05:31  Phos  2.9       Mg     2.1         TPro  6.4<L>  /  Alb  3.1<L>  /  TBili  1.0  /  DBili  x   /  AST  69<H>  /  ALT  40<H>  /  AlkPhos  56                            13.1   16.4  )-----------( 235      ( 30 Mar 2018 05:31 )             39.9       PT/INR - ( 29 Mar 2018 19:03 )   PT: 19.6 sec;   INR: 1.76 ratio         PTT - ( 29 Mar 2018 19:03 )  PTT:24.8 sec  Urinalysis Basic - ( 30 Mar 2018 05:49 )    Color: Yellow / Appearance: Slightly Turbid / S.020 / pH: x  Gluc: x / Ketone: Trace  / Bili: Negative / Urobili: Negative mg/dL   Blood: x / Protein: 30 mg/dL / Nitrite: Negative   Leuk Esterase: Small / RBC: 0-2 /HPF / WBC 3-5   Sq Epi: x / Non Sq Epi: Occasional / Bacteria: Occasional      LIVER FUNCTIONS - ( 29 Mar 2018 13:47 )  Alb: 3.1 g/dL / Pro: 6.4 g/dL / ALK PHOS: 56 U/L / ALT: 40 U/L / AST: 69 U/L / GGT: x               CAPILLARY BLOOD GLUCOSE      POCT Blood Glucose.: 220 mg/dL (30 Mar 2018 12:22)  POCT Blood Glucose.: 124 mg/dL (30 Mar 2018 07:12)  POCT Blood Glucose.: 169 mg/dL (30 Mar 2018 01:02)        RECENT CULTURES:   @ 07:15 .Sputum Sputum       Few White blood cells  Few Yeast  Few Gram positive cocci in pairs  Few Gram Positive Rods       @ 10:43 .Blood Blood-Peripheral Blood Culture PCR    Growth in aerobic and anaerobic bottles: Gram Positive Cocci in Clusters  Aerobic Bottle: 20:08 Hours to positivity  Anaerobic Bottle: 21:08 Hours to positivity  ***Blood Panel PCR results on this specimen are available  approximately 3 hours afterthe Gram stain result.***  Gram stain, PCR, and/or culture results may not always  correspond due to difference in methodologies.  ************************************************************  This PCR assay was performed using [employer].  The following targets are tested for: Enterococcus,  vancomycin resistant enterococci, Listeria monocytogenes,  coagulase negative staphylococci, S. aureus,  methicillin resistant S. aureus, Streptococcus agalactiae  (Group B), S. pneumoniae, S. pyogenes (Group A),  Acinetobacter baumannii, Enterobacter cloacae, E. coli,  Klebsiella oxytoca, K. pneumoniae, Proteus sp.,  Serratia marcescens, Haemophilus influenzae,  Neisseria meningitidis, Pseudomonas aeruginosa, Candida  albicans, C. glabrata, C krusei, C parapsilosis,  C. tropicalis and the KPC resistance gene.  "Due to technical problems, Proteus sp. will Not be reported as part of  the BCID panel until further notice"  ,  TYPE: (C=Critical, N=Notification, A=Abnormal) c  TESTS:  _ gs  DATE/TIME CALLED: _ 2018 08:52:17  CALLED TO: Charly anthony rn  READ BACK (2 Patient Identifiers)(Y/N): _ y  READ BACK VALUES (Y/N): _ y  CALLED BY: Charly otto

## 2018-03-30 NOTE — PROGRESS NOTE ADULT - ASSESSMENT
This is a 89 y/o F PMHx dementia, DM, HTN and hypothyroidism, BIBA to ED for altered mental status for the past three days. EMS states pt lives with family and has been declining for the past three days after a UTI diagnosed by a visiting at home nurse. Pt was placed on Macrobid for UTI. At baseline pt is able to ambulate with a walker and is verbal. EMS found pt hypotensive, tachycardic and warm to the touch when they arrived. Pt is on Metoprolol, Lasix, Macrobid, Aspirin and Remeron. As per son he states pt is not diabetic, she is not on any diabetic medications. He also reports pt had blood drawn on the 21st, they received a call yesterday where they were informed pt had an elevated WBC. Three days ago pt also began to be incontinent which is abnormal for her. history verified from her son and [facility] Mr. Hitesh Perera over phone who has been her caretaker for years. as per son, patient ambulates with walker at baseline and sometimes get agitated.she is currently not herself.

## 2018-03-30 NOTE — PROGRESS NOTE ADULT - SUBJECTIVE AND OBJECTIVE BOX
[facility] DIVISION OF KIDNEY DISEASES AND HYPERTENSION -- FOLLOW UP NOTE  --------------------------------------------------------------------------------  Chief Complaint: Hypernatremia    24 hour events/subjective:  Pt seen and examined this AM in SICU  Hypernatremia improving      PAST HISTORY  --------------------------------------------------------------------------------  No significant changes to PMH, PSH, FHx, SHx, unless otherwise noted    ALLERGIES & MEDICATIONS  --------------------------------------------------------------------------------  Allergies    No Known Allergies    Intolerances      Standing Inpatient Medications  ascorbic acid IVPB 1500 milliGRAM(s) IV Intermittent every 6 hours  aspirin  chewable 81 milliGRAM(s) Oral daily  chlorhexidine 0.12% Liquid 15 milliLiter(s) Swish and Spit two times a day  dexmedetomidine Infusion 0.3 MICROgram(s)/kG/Hr IV Continuous <Continuous>  dextrose 5%. 1000 milliLiter(s) IV Continuous <Continuous>  heparin  Injectable 5000 Unit(s) SubCutaneous every 12 hours  hydrocortisone sodium succinate Injectable 50 milliGRAM(s) IV Push every 6 hours  insulin lispro (HumaLOG) corrective regimen sliding scale   SubCutaneous every 6 hours  levothyroxine 12.5 MICROGram(s) Oral daily  norepinephrine Infusion 0.05 MICROgram(s)/kG/Min IV Continuous <Continuous>  pantoprazole  Injectable 40 milliGRAM(s) IV Push daily  phenylephrine    Infusion 0.3 MICROgram(s)/kG/Min IV Continuous <Continuous>  piperacillin/tazobactam IVPB. 3.375 Gram(s) IV Intermittent every 12 hours  QUEtiapine 25 milliGRAM(s) Oral at bedtime  thiamine IVPB 200 milliGRAM(s) IV Intermittent <User Schedule>    PRN Inpatient Medications  ALBUTerol    0.083% 2.5 milliGRAM(s) Nebulizer every 6 hours PRN      REVIEW OF SYSTEMS  --------------------------------------------------------------------------------  Unable to obtain    VITALS/PHYSICAL EXAM  --------------------------------------------------------------------------------  T(C): 36.2 (03-30-18 @ 12:00), Max: 37 (03-30-18 @ 04:00)  HR: 65 (03-30-18 @ 13:00) (50 - 229)  BP: 111/68 (03-30-18 @ 11:00) (7/- - 167/99)  RR: 11 (03-30-18 @ 13:00) (11 - 31)  SpO2: 100% (03-30-18 @ 13:00) (93% - 100%)  Wt(kg): --  Height (cm): 157.5 (03-29-18 @ 15:48)  Weight (kg): 60 (03-29-18 @ 15:48)  BMI (kg/m2): 24.2 (03-29-18 @ 15:48)  BSA (m2): 1.6 (03-29-18 @ 15:48)      03-29-18 @ 07:01  -  03-30-18 @ 07:00  --------------------------------------------------------  IN: 619 mL / OUT: 200 mL / NET: 419 mL    03-30-18 @ 07:01  -  03-30-18 @ 13:43  --------------------------------------------------------  IN: 1499.8 mL / OUT: 430 mL / NET: 1069.8 mL      Physical Exam:  	Gen: NAD, chronically ill appearing  	HEENT: ETT+  	Pulm: vent BS  	CV: RRR, S1S2; no rub  	Back: No spinal or CVA tenderness; no sacral edema  	Abd: +BS, soft, nontender/nondistended  	: No suprapubic tenderness  	UE: Warm, FROM, no clubbing, intact strength; no edema; no asterixis  	LE: Warm, FROM, no clubbing, intact strength; no edema  	Neuro: No focal deficits, intact gait  	Psych: Normal affect and mood  	Skin: Warm, without rashes  	Vascular access:    LABS/STUDIES  --------------------------------------------------------------------------------              13.1   16.4  >-----------<  235      [03-30-18 @ 05:31]              39.9     156  |  118  |  57.0  ----------------------------<  161      [03-30-18 @ 05:31]  3.0   |  23.0  |  2.36        Ca     8.1     [03-30-18 @ 05:31]      Mg     2.1     [03-30-18 @ 05:31]      Phos  2.9     [03-30-18 @ 05:31]    TPro  6.4  /  Alb  3.1  /  TBili  1.0  /  DBili  x   /  AST  69  /  ALT  40  /  AlkPhos  56  [03-29-18 @ 13:47]    PT/INR: PT 19.6 , INR 1.76       [03-29-18 @ 19:03]  PTT: 24.8       [03-29-18 @ 19:03]      Creatinine Trend:  SCr 2.36 [03-30 @ 05:31]  SCr 2.63 [03-29 @ 19:03]  SCr 2.67 [03-29 @ 13:47]  SCr 2.92 [03-29 @ 10:43]    Urinalysis - [03-30-18 @ 05:49]      Color Yellow / Appearance Slightly Turbid / SG 1.020 / pH 5.0      Gluc Negative / Ketone Trace  / Bili Negative / Urobili Negative       Blood Small / Protein 30 / Leuk Est Small / Nitrite Negative      RBC 0-2 / WBC 3-5 / Hyaline  / Gran  / Sq Epi  / Non Sq Epi Occasional / Bacteria Occasional    Urine Sodium 33      [03-30-18 @ 05:48]  Urine Osmolality 513      [03-30-18 @ 05:49]    HbA1c 5.4      [03-30-18 @ 05:32]  TSH 0.33      [03-30-18 @ 05:33]

## 2018-03-30 NOTE — PROGRESS NOTE ADULT - ASSESSMENT
THIS 89 Y.O. F HERE FOR DELIRIUM WITH BASELINE DEMENTIA, RECENT UTI, NOW FOUND WITH:    FEVERS, SEPSIS  POSITIVE UA LIKELY UTI  RETROCARDIAC / LLL INFILTRATE POSSIBLE PNA  WBC ELEVATION  ACUTE RENAL FAILURE  HYPERNATREMIA  ENCEPHALOPATHY    now intubated    - repeat blood cultures ordered; GPC clusters/ CoNS on FilmArray - likely skin venkat   - CONTINUE ZOSYN Q12H; one dose vanco by ICU team  - continue respiratory support   - NEED CLOSE MONITORING OF ELECTROLYTES   - TREND RENAL FUNCTION FOR HYPERNATREMIA AND ARF; NEPHROLOGY FOLLOWING.

## 2018-03-31 LAB
-  AMPICILLIN/SULBACTAM: SIGNIFICANT CHANGE UP
-  CEFAZOLIN: SIGNIFICANT CHANGE UP
-  CIPROFLOXACIN: SIGNIFICANT CHANGE UP
-  CLINDAMYCIN: SIGNIFICANT CHANGE UP
-  ERYTHROMYCIN: SIGNIFICANT CHANGE UP
-  GENTAMICIN: SIGNIFICANT CHANGE UP
-  LEVOFLOXACIN: SIGNIFICANT CHANGE UP
-  MOXIFLOXACIN(AEROBIC): SIGNIFICANT CHANGE UP
-  OXACILLIN: SIGNIFICANT CHANGE UP
-  PENICILLIN: SIGNIFICANT CHANGE UP
-  RIFAMPIN: SIGNIFICANT CHANGE UP
-  TETRACYCLINE: SIGNIFICANT CHANGE UP
-  TRIMETHOPRIM/SULFAMETHOXAZOLE: SIGNIFICANT CHANGE UP
-  VANCOMYCIN: SIGNIFICANT CHANGE UP
ANION GAP SERPL CALC-SCNC: 17 MMOL/L — SIGNIFICANT CHANGE UP (ref 5–17)
BUN SERPL-MCNC: 28 MG/DL — HIGH (ref 8–20)
CALCIUM SERPL-MCNC: 6.7 MG/DL — LOW (ref 8.6–10.2)
CHLORIDE SERPL-SCNC: 109 MMOL/L — HIGH (ref 98–107)
CO2 SERPL-SCNC: 19 MMOL/L — LOW (ref 22–29)
CREAT SERPL-MCNC: 0.96 MG/DL — SIGNIFICANT CHANGE UP (ref 0.5–1.3)
CULTURE RESULTS: NO GROWTH — SIGNIFICANT CHANGE UP
CULTURE RESULTS: SIGNIFICANT CHANGE UP
GLUCOSE BLDC GLUCOMTR-MCNC: 144 MG/DL — HIGH (ref 70–99)
GLUCOSE BLDC GLUCOMTR-MCNC: 147 MG/DL — HIGH (ref 70–99)
GLUCOSE BLDC GLUCOMTR-MCNC: 151 MG/DL — HIGH (ref 70–99)
GLUCOSE BLDC GLUCOMTR-MCNC: 153 MG/DL — HIGH (ref 70–99)
GLUCOSE SERPL-MCNC: 158 MG/DL — HIGH (ref 70–115)
HCT VFR BLD CALC: 35.3 % — LOW (ref 37–47)
HGB BLD-MCNC: 11.7 G/DL — LOW (ref 12–16)
MAGNESIUM SERPL-MCNC: 2 MG/DL — SIGNIFICANT CHANGE UP (ref 1.6–2.6)
MCHC RBC-ENTMCNC: 32 PG — HIGH (ref 27–31)
MCHC RBC-ENTMCNC: 33.1 G/DL — SIGNIFICANT CHANGE UP (ref 32–36)
MCV RBC AUTO: 96.4 FL — SIGNIFICANT CHANGE UP (ref 81–99)
METHOD TYPE: SIGNIFICANT CHANGE UP
ORGANISM # SPEC MICROSCOPIC CNT: SIGNIFICANT CHANGE UP
PHOSPHATE SERPL-MCNC: 3.7 MG/DL — SIGNIFICANT CHANGE UP (ref 2.4–4.7)
PLATELET # BLD AUTO: 228 K/UL — SIGNIFICANT CHANGE UP (ref 150–400)
POTASSIUM SERPL-MCNC: 4.7 MMOL/L — SIGNIFICANT CHANGE UP (ref 3.5–5.3)
POTASSIUM SERPL-SCNC: 4.7 MMOL/L — SIGNIFICANT CHANGE UP (ref 3.5–5.3)
RBC # BLD: 3.66 M/UL — LOW (ref 4.4–5.2)
RBC # FLD: 14.6 % — SIGNIFICANT CHANGE UP (ref 11–15.6)
SODIUM SERPL-SCNC: 145 MMOL/L — SIGNIFICANT CHANGE UP (ref 135–145)
SPECIMEN SOURCE: SIGNIFICANT CHANGE UP
SPECIMEN SOURCE: SIGNIFICANT CHANGE UP
VANCOMYCIN TROUGH SERPL-MCNC: 9.7 UG/ML — LOW (ref 10–20)
WBC # BLD: 24.6 K/UL — HIGH (ref 4.8–10.8)
WBC # FLD AUTO: 24.6 K/UL — HIGH (ref 4.8–10.8)

## 2018-03-31 PROCEDURE — 99233 SBSQ HOSP IP/OBS HIGH 50: CPT

## 2018-03-31 RX ORDER — NOREPINEPHRINE BITARTRATE/D5W 8 MG/250ML
0.1 PLASTIC BAG, INJECTION (ML) INTRAVENOUS
Qty: 8 | Refills: 0 | Status: DISCONTINUED | OUTPATIENT
Start: 2018-03-31 | End: 2018-03-31

## 2018-03-31 RX ORDER — IPRATROPIUM/ALBUTEROL SULFATE 18-103MCG
3 AEROSOL WITH ADAPTER (GRAM) INHALATION EVERY 6 HOURS
Qty: 0 | Refills: 0 | Status: DISCONTINUED | OUTPATIENT
Start: 2018-03-31 | End: 2018-04-06

## 2018-03-31 RX ORDER — SODIUM CHLORIDE 9 MG/ML
1000 INJECTION, SOLUTION INTRAVENOUS
Qty: 0 | Refills: 0 | Status: DISCONTINUED | OUTPATIENT
Start: 2018-03-31 | End: 2018-04-02

## 2018-03-31 RX ORDER — BACITRACIN ZINC 500 UNIT/G
1 OINTMENT IN PACKET (EA) TOPICAL EVERY 12 HOURS
Qty: 0 | Refills: 0 | Status: DISCONTINUED | OUTPATIENT
Start: 2018-03-31 | End: 2018-04-06

## 2018-03-31 RX ADMIN — Medication 50 MILLIGRAM(S): at 23:29

## 2018-03-31 RX ADMIN — CHLORHEXIDINE GLUCONATE 15 MILLILITER(S): 213 SOLUTION TOPICAL at 05:52

## 2018-03-31 RX ADMIN — Medication 50 MILLIGRAM(S): at 17:28

## 2018-03-31 RX ADMIN — Medication 103 MILLIGRAM(S): at 17:28

## 2018-03-31 RX ADMIN — SODIUM CHLORIDE 125 MILLILITER(S): 9 INJECTION, SOLUTION INTRAVENOUS at 03:30

## 2018-03-31 RX ADMIN — SODIUM CHLORIDE 85 MILLILITER(S): 9 INJECTION, SOLUTION INTRAVENOUS at 11:04

## 2018-03-31 RX ADMIN — SODIUM CHLORIDE 85 MILLILITER(S): 9 INJECTION, SOLUTION INTRAVENOUS at 20:00

## 2018-03-31 RX ADMIN — Medication 81 MILLIGRAM(S): at 11:03

## 2018-03-31 RX ADMIN — Medication 50 MILLIGRAM(S): at 05:52

## 2018-03-31 RX ADMIN — HEPARIN SODIUM 5000 UNIT(S): 5000 INJECTION INTRAVENOUS; SUBCUTANEOUS at 17:28

## 2018-03-31 RX ADMIN — PIPERACILLIN AND TAZOBACTAM 25 GRAM(S): 4; .5 INJECTION, POWDER, LYOPHILIZED, FOR SOLUTION INTRAVENOUS at 05:52

## 2018-03-31 RX ADMIN — Medication 2: at 05:53

## 2018-03-31 RX ADMIN — POTASSIUM PHOSPHATE, MONOBASIC POTASSIUM PHOSPHATE, DIBASIC 83.33 MILLIMOLE(S): 236; 224 INJECTION, SOLUTION INTRAVENOUS at 00:27

## 2018-03-31 RX ADMIN — PIPERACILLIN AND TAZOBACTAM 25 GRAM(S): 4; .5 INJECTION, POWDER, LYOPHILIZED, FOR SOLUTION INTRAVENOUS at 17:28

## 2018-03-31 RX ADMIN — Medication 50 MILLIEQUIVALENT(S): at 00:07

## 2018-03-31 RX ADMIN — Medication 2: at 11:03

## 2018-03-31 RX ADMIN — Medication 50 MILLIGRAM(S): at 11:03

## 2018-03-31 RX ADMIN — Medication 12.5 MICROGRAM(S): at 05:53

## 2018-03-31 RX ADMIN — Medication 103 MILLIGRAM(S): at 11:04

## 2018-03-31 RX ADMIN — HEPARIN SODIUM 5000 UNIT(S): 5000 INJECTION INTRAVENOUS; SUBCUTANEOUS at 05:52

## 2018-03-31 RX ADMIN — Medication 103 MILLIGRAM(S): at 05:52

## 2018-03-31 RX ADMIN — Medication 1 APPLICATION(S): at 22:07

## 2018-03-31 RX ADMIN — Medication 103 MILLIGRAM(S): at 23:29

## 2018-03-31 RX ADMIN — Medication 50 MILLIEQUIVALENT(S): at 01:06

## 2018-03-31 RX ADMIN — Medication 11.25 MICROGRAM(S)/KG/MIN: at 00:25

## 2018-03-31 NOTE — DIETITIAN INITIAL EVALUATION ADULT. - SIGNS/SYMPTOMS
as evidenced by +fluid accumulation in all 4 extremities, signs of muscle mass loss temple, shoulder

## 2018-03-31 NOTE — DIETITIAN INITIAL EVALUATION ADULT. - ETIOLOGY
Related to inability to consume sufficient energy in setting of dementia, new dx of PNA and admission with dehydration

## 2018-03-31 NOTE — PROGRESS NOTE ADULT - ASSESSMENT
89 YOF with septic shock, possibly PNA vs UTI but now with positive blood cultures for GPC clusters possible contan, intubated for acute hypoxic respiratory failure and was extubated this am, AMS/encephalopathy.

## 2018-03-31 NOTE — PROGRESS NOTE ADULT - SUBJECTIVE AND OBJECTIVE BOX
89 y/o F with a h/o dementia, DM, HTN, hypothyroidism, recent UTI being treated with Macrobid, presents to ED today with AMS and poor PO intake. Patient found to be hypotensive, tachycardia, and febrile. UA (+). CXR suspicious for LLL infiltrate. Also noted to be in acute renal failure with profound hypernatremia (Na: 166). As per report, family states that although patient has h/o dementia, she is  able to converse and ambulate with help of a walker.  Patient is a 89y old  Female who presents with a chief complaint of AMS, (30 Mar 2018 01:47)      BRIEF HOSPITAL COURSE: Pt was admitted and is being treated for sepsis with UTI and PNA.  she was intubated yesterday for worsening hypoxia, AMS and septic shock.  She required short dose of pressors overnight lastnight.  She did have episodes of bradycardia and midodrine was stopped as potential cause of bradycardia.    Events last 24 hours: This am pt tolerated SBT she was extubated.  She does have secretions and will need aggressive pulmonary toilet.  She is sitting OOB in a chair at present, responds to some commands in German but is not following well, answers some yes/no questions appropriately but still very confused/encephalopathic.    PAST MEDICAL & SURGICAL HISTORY:  Dementia with behavioral disturbance, unspecified dementia type  Depression  Hypothyroidism  DJD (degenerative joint disease)  Hypertension  H/O: hysterectomy      Review of Systems:  Pt unable to answer questions in ROS, too confused    MEDICATIONS  (STANDING):  ascorbic acid IVPB 1500 milliGRAM(s) IV Intermittent every 6 hours  aspirin  chewable 81 milliGRAM(s) Oral daily  heparin  Injectable 5000 Unit(s) SubCutaneous every 12 hours  hydrocortisone sodium succinate Injectable 50 milliGRAM(s) IV Push every 6 hours  insulin lispro (HumaLOG) corrective regimen sliding scale   SubCutaneous every 6 hours  levothyroxine 12.5 MICROGram(s) Oral daily  piperacillin/tazobactam IVPB. 3.375 Gram(s) IV Intermittent every 12 hours  QUEtiapine 25 milliGRAM(s) Oral at bedtime  sodium chloride 0.45%. 1000 milliLiter(s) (85 mL/Hr) IV Continuous <Continuous>                 11.7   24.6   )----------(  228       ( 31 Mar 2018 04:38 )               35.3      145    |  109    |  28.0   ----------------------------<  158        ( 31 Mar 2018 04:38 )  4.7     |  19.0   |  0.96     Ca    6.7        ( 31 Mar 2018 04:38 )  Phos  3.7       ( 31 Mar 2018 04:38 )  Mg     2.0       ( 31 Mar 2018 04:38)    ICU Vital Signs Last 24 Hrs  T(C): 36.5 (31 Mar 2018 08:00), Max: 37 (31 Mar 2018 04:00)  T(F): 97.7 (31 Mar 2018 08:00), Max: 98.6 (31 Mar 2018 04:00)  HR: 91 (31 Mar 2018 09:00) (50 - 127)  BP: 111/68 (30 Mar 2018 11:00) (111/68 - 123/76)  BP(mean): 96 (30 Mar 2018 10:41) (82 - 96)  ABP: 101/68 (31 Mar 2018 09:00) (70/35 - 195/97)  ABP(mean): 83 (31 Mar 2018 09:00) (46 - 142)  RR: 21 (31 Mar 2018 09:00) (11 - 32)  SpO2: 100% (31 Mar 2018 09:00) (97% - 100%)    RECENT CULTURES:  03-30 .Sputum Sputum XXXX   Few White blood cells  Few Yeast  Few Gram positive cocci in pairs  Few Gram Positive Rods   Numerous Nova albicans  Numerous Routine respiratory venkat present  Culture in progress    03-29 .Blood Blood-Peripheral Blood Culture PCR XXXX   Growth in aerobic and anaerobic bottles: Gram Positive Cocci in Clusters  Aerobic Bottle: 20:08 Hours to positivity  Anaerobic Bottle: 21:08 Hours to positivity  ***Blood Panel PCR results on this specimen are available  approximately 3 hours afterthe Gram stain result.***  Gram stain, PCR, and/or culture results may not always  correspond due to difference in methodologies.  ************************************************************  This PCR assay was performed using [employer].  The following targets are tested for: Enterococcus,  vancomycin resistant enterococci, Listeria monocytogenes,  coagulase negative staphylococci, S. aureus,  methicillin resistant S. aureus, Streptococcus agalactiae  (Group B), S. pneumoniae, S. pyogenes (Group A),  Acinetobacter baumannii, Enterobacter cloacae, E. coli,  Klebsiella oxytoca, K. pneumoniae, Proteus sp.,  Serratia marcescens, Haemophilus influenzae,  Neisseria meningitidis, Pseudomonas aeruginosa, Candida  albicans, C. glabrata, C krusei, C parapsilosis,  C. tropicalis and the KPC resistance gene.  "Due to technical problems, Proteus sp. will Not be reported as part of  the BCID panel until further notice"  ,  TYPE: (C=Critical, N=Notification, A=Abnormal) c  TESTS:  _ gs  DATE/TIME CALLED: _ 03/30/2018 08:52:17  CALLED TO: Charly anthony rn  READ BACK (2 Patient Identifiers)(Y/N): _ y  READ BACK VALUES (Y/N): _ y  CALLED BY: Charly otto          Physical Examination:    General: Mild respiratory distress, getting neb tx now, just extubated. sleepy    HEENT: Pupils equal, reactive to light.  Symmetric.    PULM: Course with scattered rhonci bilaterally, pos significant sputum production    CVS: Regular rate and rhythm, no murmurs, rubs, or gallops    ABD: Soft, nondistended, nontender, normoactive bowel sounds, no masses    EXT: mild edema, nontender    SKIN: Warm and well perfused, no rashes noted.    RADIOLOGY: images reviewed    CRITICAL CARE TIME SPENT: 45 min

## 2018-03-31 NOTE — CHART NOTE - NSCHARTNOTEFT_GEN_A_CORE
Upon Nutritional Assessment by the Registered Dietitian your patient was determined to meet criteria / has evidence of the following diagnosis/diagnoses:          [ ]  Mild Protein Calorie Malnutrition        [x ]  Moderate Protein Calorie Malnutrition        [ ] Severe Protein Calorie Malnutrition        [ ] Unspecified Protein Calorie Malnutrition        [ ] Underweight / BMI <19        [ ] Morbid Obesity / BMI > 40      Findings as based on:  •  Comprehensive nutrition assessment and consultation  •  Calorie counts (nutrient intake analysis)  •  Food acceptance and intake status from observations by staff  •  Follow up  •  Patient education  •  Intervention secondary to interdisciplinary rounds  •   concerns      Treatment:    The following diet has been recommended:  Consider swallow evaluation prior to feeding pt    Ensure Enlive with meals as/when diet advanced     PROVIDER Section:     By signing this assessment you are acknowledging and agree with the diagnosis/diagnoses assigned by the Registered Dietitian    Comments:

## 2018-03-31 NOTE — DIETITIAN INITIAL EVALUATION ADULT. - PERTINENT LABORATORY DATA
103-31 Na145 mmol/L Glu 158 mg/dL<H> K+ 4.7 mmol/L Cr  0.96 mg/dL BUN 28.0 mg/dL<H> Phos 3.7 mg/dL Alb n/a   PAB n/a

## 2018-03-31 NOTE — PROGRESS NOTE ADULT - PROBLEM SELECTOR PLAN 1
Awaiting cultures  blood cultures with GPC only 1 bottle possible contaminant repeat cultures pending  continue zosyn for possible PNA vs UTI  stress dose steroids  Off pressors at present but BP soft and unable to tolerate midodrine, will monitor in ICU as may continue to need pressors on/off

## 2018-03-31 NOTE — CHART NOTE - NSCHARTNOTEFT_GEN_A_CORE
-caleld by bedside RN as aptient having episodes of bradycardia to 40's, but spotnaneously returns to 50-60 range    -patient currently on VICTOR HUGO gtt for shock, will switch to levophed to help with BP and HR -beboeld by bedside RN as aptient having episodes of bradycardia to 40's, but spotnaneously returns to 50-60 range    -patient currently on VICTOR HUGO gtt for shock, will switch to levophed to help with BP and HR  -patient also on PO midodrine, which could be contributing to Bradycardia, will D/C

## 2018-03-31 NOTE — DIETITIAN INITIAL EVALUATION ADULT. - OTHER INFO
pt with hx of dementia, unable to obtain wt history; pt s/p extubation; remains NPO. Pt with + edema in all 4 extremities,

## 2018-04-01 DIAGNOSIS — E03.9 HYPOTHYROIDISM, UNSPECIFIED: ICD-10-CM

## 2018-04-01 DIAGNOSIS — G93.40 ENCEPHALOPATHY, UNSPECIFIED: ICD-10-CM

## 2018-04-01 DIAGNOSIS — I10 ESSENTIAL (PRIMARY) HYPERTENSION: ICD-10-CM

## 2018-04-01 LAB
ANION GAP SERPL CALC-SCNC: 16 MMOL/L — SIGNIFICANT CHANGE UP (ref 5–17)
BUN SERPL-MCNC: 19 MG/DL — SIGNIFICANT CHANGE UP (ref 8–20)
CALCIUM SERPL-MCNC: 6.9 MG/DL — LOW (ref 8.6–10.2)
CHLORIDE SERPL-SCNC: 112 MMOL/L — HIGH (ref 98–107)
CO2 SERPL-SCNC: 20 MMOL/L — LOW (ref 22–29)
CREAT SERPL-MCNC: 0.66 MG/DL — SIGNIFICANT CHANGE UP (ref 0.5–1.3)
CULTURE RESULTS: SIGNIFICANT CHANGE UP
GLUCOSE BLDC GLUCOMTR-MCNC: 102 MG/DL — HIGH (ref 70–99)
GLUCOSE BLDC GLUCOMTR-MCNC: 118 MG/DL — HIGH (ref 70–99)
GLUCOSE BLDC GLUCOMTR-MCNC: 119 MG/DL — HIGH (ref 70–99)
GLUCOSE BLDC GLUCOMTR-MCNC: 95 MG/DL — SIGNIFICANT CHANGE UP (ref 70–99)
GLUCOSE SERPL-MCNC: 121 MG/DL — HIGH (ref 70–115)
HCT VFR BLD CALC: 29.6 % — LOW (ref 37–47)
HGB BLD-MCNC: 9.9 G/DL — LOW (ref 12–16)
MAGNESIUM SERPL-MCNC: 2 MG/DL — SIGNIFICANT CHANGE UP (ref 1.6–2.6)
MCHC RBC-ENTMCNC: 32.9 PG — HIGH (ref 27–31)
MCHC RBC-ENTMCNC: 33.4 G/DL — SIGNIFICANT CHANGE UP (ref 32–36)
MCV RBC AUTO: 98.3 FL — SIGNIFICANT CHANGE UP (ref 81–99)
PHOSPHATE SERPL-MCNC: 2.4 MG/DL — SIGNIFICANT CHANGE UP (ref 2.4–4.7)
PLATELET # BLD AUTO: 192 K/UL — SIGNIFICANT CHANGE UP (ref 150–400)
POTASSIUM SERPL-MCNC: 3.1 MMOL/L — LOW (ref 3.5–5.3)
POTASSIUM SERPL-SCNC: 3.1 MMOL/L — LOW (ref 3.5–5.3)
RBC # BLD: 3.01 M/UL — LOW (ref 4.4–5.2)
RBC # FLD: 14.4 % — SIGNIFICANT CHANGE UP (ref 11–15.6)
SODIUM SERPL-SCNC: 148 MMOL/L — HIGH (ref 135–145)
SPECIMEN SOURCE: SIGNIFICANT CHANGE UP
WBC # BLD: 12.2 K/UL — HIGH (ref 4.8–10.8)
WBC # FLD AUTO: 12.2 K/UL — HIGH (ref 4.8–10.8)

## 2018-04-01 PROCEDURE — 99233 SBSQ HOSP IP/OBS HIGH 50: CPT

## 2018-04-01 RX ORDER — POTASSIUM CHLORIDE 20 MEQ
10 PACKET (EA) ORAL
Qty: 0 | Refills: 0 | Status: COMPLETED | OUTPATIENT
Start: 2018-04-01 | End: 2018-04-01

## 2018-04-01 RX ORDER — POTASSIUM CHLORIDE 20 MEQ
40 PACKET (EA) ORAL ONCE
Qty: 0 | Refills: 0 | Status: COMPLETED | OUTPATIENT
Start: 2018-04-01 | End: 2018-04-01

## 2018-04-01 RX ORDER — POTASSIUM CHLORIDE 20 MEQ
10 PACKET (EA) ORAL ONCE
Qty: 0 | Refills: 0 | Status: COMPLETED | OUTPATIENT
Start: 2018-04-01 | End: 2018-04-01

## 2018-04-01 RX ADMIN — Medication 103 MILLIGRAM(S): at 05:26

## 2018-04-01 RX ADMIN — Medication 3 MILLILITER(S): at 22:50

## 2018-04-01 RX ADMIN — QUETIAPINE FUMARATE 25 MILLIGRAM(S): 200 TABLET, FILM COATED ORAL at 22:26

## 2018-04-01 RX ADMIN — PIPERACILLIN AND TAZOBACTAM 25 GRAM(S): 4; .5 INJECTION, POWDER, LYOPHILIZED, FOR SOLUTION INTRAVENOUS at 18:11

## 2018-04-01 RX ADMIN — HEPARIN SODIUM 5000 UNIT(S): 5000 INJECTION INTRAVENOUS; SUBCUTANEOUS at 18:08

## 2018-04-01 RX ADMIN — SODIUM CHLORIDE 85 MILLILITER(S): 9 INJECTION, SOLUTION INTRAVENOUS at 22:26

## 2018-04-01 RX ADMIN — Medication 103 MILLIGRAM(S): at 18:10

## 2018-04-01 RX ADMIN — Medication 1 APPLICATION(S): at 05:27

## 2018-04-01 RX ADMIN — Medication 1 APPLICATION(S): at 18:08

## 2018-04-01 RX ADMIN — Medication 12.5 MICROGRAM(S): at 05:25

## 2018-04-01 RX ADMIN — Medication 100 MILLIEQUIVALENT(S): at 10:40

## 2018-04-01 RX ADMIN — Medication 100 MILLIEQUIVALENT(S): at 12:41

## 2018-04-01 RX ADMIN — Medication 50 MILLIGRAM(S): at 05:25

## 2018-04-01 RX ADMIN — Medication 103 MILLIGRAM(S): at 11:40

## 2018-04-01 RX ADMIN — HEPARIN SODIUM 5000 UNIT(S): 5000 INJECTION INTRAVENOUS; SUBCUTANEOUS at 05:24

## 2018-04-01 RX ADMIN — PIPERACILLIN AND TAZOBACTAM 25 GRAM(S): 4; .5 INJECTION, POWDER, LYOPHILIZED, FOR SOLUTION INTRAVENOUS at 05:25

## 2018-04-01 RX ADMIN — Medication 100 MILLIEQUIVALENT(S): at 17:29

## 2018-04-01 RX ADMIN — Medication 100 MILLIEQUIVALENT(S): at 15:15

## 2018-04-01 RX ADMIN — Medication 81 MILLIGRAM(S): at 11:41

## 2018-04-01 RX ADMIN — Medication 103 MILLIGRAM(S): at 23:04

## 2018-04-01 NOTE — PROGRESS NOTE ADULT - SUBJECTIVE AND OBJECTIVE BOX
MEDICINE TRANSFER NOTE      DARREL PHILLIP    [id number]    89y      Female    CC: AMS    HPI in brief: 89 y/o female pmhx of HTN, DM, Dementia, hypothyroidism admitted on 3/29 w/ septic shock 2/2 UTI and pneumonia  requiring short period of pressors, as well as acute hypoxic respiratory failure requiring intubation. Weaned off pressors and started on midodrine however she  had episodes of bradycardia on midodrine and it was discontinued. SHe was extubated on 3/31 and has done well on NC. she is stable for transfer out of MICU.        INTERVAL HPI/OVERNIGHT EVENTS: as in HPI     REVIEW OF SYSTEMS:    CONSTITUTIONAL: No fever, weight loss, or fatigue  RESPIRATORY: No cough, wheezing, hemoptysis; No shortness of breath  CARDIOVASCULAR: No chest pain, palpitations  GASTROINTESTINAL: No abdominal or epigastric pain. No nausea, vomiting  NEUROLOGICAL: No headaches, memory loss, loss of strength.  MISCELLANEOUS:      Vital Signs Last 24 Hrs  T(C): 36.7 (01 Apr 2018 07:21), Max: 36.7 (31 Mar 2018 16:00)  T(F): 98.1 (01 Apr 2018 07:21), Max: 98.1 (31 Mar 2018 16:00)  HR: 104 (01 Apr 2018 09:00) (70 - 104)  BP: 114/54 (01 Apr 2018 08:00) (95/61 - 121/61)  BP(mean): 77 (01 Apr 2018 08:00) (67 - 91)  RR: 31 (01 Apr 2018 09:00) (16 - 41)  SpO2: 97% (01 Apr 2018 09:00) (66% - 100%)    PHYSICAL EXAM:    GENERAL: NAD, well-groomed  HEENT: PERRL, +EOMI  NECK: soft, Supple, No JVD,   CHEST/LUNG: Clear to percussion bilaterally; No wheezing  HEART: S1S2+, Regular rate and rhythm; No murmurs, rubs, or gallops  ABDOMEN: Soft, Nontender, Nondistended; Bowel sounds present  EXTREMITIES:  2+ Peripheral Pulses, No clubbing, cyanosis, or edema  SKIN: No rashes or lesions  NEURO: AAOX3, no focal deficits, no motor r sensory loss  PSYCH: normal mood      03-31 @ 07:01  -  04-01 @ 07:00  --------------------------------------------------------  IN: 2710 mL / OUT: 150 mL / NET: 2560 mL    04-01 @ 07:01  -  04-01 @ 10:15  --------------------------------------------------------  IN: 220 mL / OUT: 0 mL / NET: 220 mL        LABS:                        9.9    12.2  )-----------( 192      ( 01 Apr 2018 08:25 )             29.6     04-01    148<H>  |  112<H>  |  19.0  ----------------------------<  121<H>  3.1<L>   |  20.0<L>  |  0.66    Ca    6.9<L>      01 Apr 2018 08:25  Phos  2.4     04-01  Mg     2.0     04-01              MEDICATIONS  (STANDING):  ascorbic acid IVPB 1500 milliGRAM(s) IV Intermittent every 6 hours  aspirin  chewable 81 milliGRAM(s) Oral daily  BACItracin   Ointment 1 Application(s) Topical every 12 hours  heparin  Injectable 5000 Unit(s) SubCutaneous every 12 hours  hydrocortisone sodium succinate Injectable 50 milliGRAM(s) IV Push every 6 hours  insulin lispro (HumaLOG) corrective regimen sliding scale   SubCutaneous every 6 hours  levothyroxine 12.5 MICROGram(s) Oral daily  piperacillin/tazobactam IVPB. 3.375 Gram(s) IV Intermittent every 12 hours  potassium chloride   Powder 40 milliEquivalent(s) Oral once  potassium chloride  10 mEq/100 mL IVPB 10 milliEquivalent(s) IV Intermittent once  QUEtiapine 25 milliGRAM(s) Oral at bedtime  sodium chloride 0.45%. 1000 milliLiter(s) (85 mL/Hr) IV Continuous <Continuous>    MEDICATIONS  (PRN):  ALBUTerol    0.083% 2.5 milliGRAM(s) Nebulizer every 6 hours PRN SOB  ALBUTerol/ipratropium for Nebulization 3 milliLiter(s) Nebulizer every 6 hours PRN Shortness of Breath      RADIOLOGY & ADDITIONAL TESTS: MEDICINE TRANSFER NOTE      DARREL PHILLIP    [id number]    89y      Female    CC: AMS  still remains very confused, only oriented to self.    HPI in brief: 89 y/o female pmhx of HTN, DM, Dementia, hypothyroidism admitted on 3/29 w/ septic shock 2/2 UTI and pneumonia  requiring short period of pressors, as well as acute hypoxic respiratory failure requiring intubation. Weaned off pressors and started on midodrine however she  had episodes of bradycardia on midodrine and it was discontinued. SHe was extubated on 3/31 and has done well on NC. she is stable for transfer out of MICU.      INTERVAL HPI/OVERNIGHT EVENTS: as in HPI     REVIEW OF SYSTEMS:    unable to obtain complete ROS 2/2 confused mental state  Denies any pain, cough     Vital Signs Last 24 Hrs  T(C): 36.7 (01 Apr 2018 07:21), Max: 36.7 (31 Mar 2018 16:00)  T(F): 98.1 (01 Apr 2018 07:21), Max: 98.1 (31 Mar 2018 16:00)  HR: 104 (01 Apr 2018 09:00) (70 - 104)  BP: 114/54 (01 Apr 2018 08:00) (95/61 - 121/61)  BP(mean): 77 (01 Apr 2018 08:00) (67 - 91)  RR: 31 (01 Apr 2018 09:00) (16 - 41)  SpO2: 97% (01 Apr 2018 09:00) (66% - 100%)    PHYSICAL EXAM:    GENERAL: NAD  HEENT: PERRL, +EOMI  NECK: soft, Supple  CHEST/LUNG: Clear to auscultation bilaterally; occasional wheezing on left  HEART: S1S2+, Regular rate and rhythm; No murmurs  ABDOMEN: Soft, Nontender, Nondistended; Bowel sounds present  EXTREMITIES:   No clubbing, cyanosis, or edema. RUL - edematous, fragile skin, skin tears+  NEURO: awake, alert, follows commands, Oriented x 1 - self only.      03-31 @ 07:01  -  04-01 @ 07:00  --------------------------------------------------------  IN: 2710 mL / OUT: 150 mL / NET: 2560 mL    04-01 @ 07:01  - 04-01 @ 10:15  --------------------------------------------------------  IN: 220 mL / OUT: 0 mL / NET: 220 mL        LABS:                        9.9    12.2  )-----------( 192      ( 01 Apr 2018 08:25 )             29.6     04-01    148<H>  |  112<H>  |  19.0  ----------------------------<  121<H>  3.1<L>   |  20.0<L>  |  0.66    Ca    6.9<L>      01 Apr 2018 08:25  Phos  2.4     04-01  Mg     2.0     04-01              MEDICATIONS  (STANDING):  ascorbic acid IVPB 1500 milliGRAM(s) IV Intermittent every 6 hours  aspirin  chewable 81 milliGRAM(s) Oral daily  BACItracin   Ointment 1 Application(s) Topical every 12 hours  heparin  Injectable 5000 Unit(s) SubCutaneous every 12 hours  hydrocortisone sodium succinate Injectable 50 milliGRAM(s) IV Push every 6 hours  insulin lispro (HumaLOG) corrective regimen sliding scale   SubCutaneous every 6 hours  levothyroxine 12.5 MICROGram(s) Oral daily  piperacillin/tazobactam IVPB. 3.375 Gram(s) IV Intermittent every 12 hours  potassium chloride   Powder 40 milliEquivalent(s) Oral once  potassium chloride  10 mEq/100 mL IVPB 10 milliEquivalent(s) IV Intermittent once  QUEtiapine 25 milliGRAM(s) Oral at bedtime  sodium chloride 0.45%. 1000 milliLiter(s) (85 mL/Hr) IV Continuous <Continuous>    MEDICATIONS  (PRN):  ALBUTerol    0.083% 2.5 milliGRAM(s) Nebulizer every 6 hours PRN SOB  ALBUTerol/ipratropium for Nebulization 3 milliLiter(s) Nebulizer every 6 hours PRN Shortness of Breath      RADIOLOGY & ADDITIONAL TESTS:  CXR - films reivewed

## 2018-04-01 NOTE — PROGRESS NOTE ADULT - PROBLEM SELECTOR PLAN 1
Resolved, HD stable off pressors. No midodrine due to episodes of bradycardia Resolved, HD stable off pressors. No midodrine due to episodes of bradycardia  Blood cultures  One bottle of blood cultures growing GPC, likely containment. Repeat cultures   pending .  c/w zosyn for PNA/ UTI   Lyssa protocol  f/u ID reccs

## 2018-04-01 NOTE — PROGRESS NOTE ADULT - ASSESSMENT
89 y/o female pmhx of HTN, Dementia, hypothyroidism admitted on 3/29 w/ septic shock 2/2 UTI and pneumonia  requiring short period of pressors, as well as acute hypoxic respiratory failure requiring intubation. At this time patient does not require an ICU level of care as she is now HD stable, on 2L NC, w/out any respiratory compromise. Up for transfer to monitored bed w/ . Case endorsed to Dr. Matthews.

## 2018-04-01 NOTE — PROGRESS NOTE ADULT - SUBJECTIVE AND OBJECTIVE BOX
Patient is a 89y old  Female who presents with a chief complaint of AMS, (30 Mar 2018 01:47)      BRIEF HOSPITAL COURSE:  89 y/o female pmhx of HTN, Dementia, hypothyroidism admitted on 3/29 w/ septic shock 2/2 UTI and pneumonia  requiring short period of pressors, as well as acute hypoxic respiratory failure requiring intubation. Weaned off pressors and started on midodrine however she  had episodes of bradycardia on midodrine and it was discontinued.     Events last 24 hours:  She was extubated yesterday morning. Tolerating 2L NC overnight into this morning. She remains confused likely due to her underlying dementia     PAST MEDICAL & SURGICAL HISTORY:  Dementia with behavioral disturbance, unspecified dementia type  Depression  Hypothyroidism  DJD (degenerative joint disease)  Hypertension  H/O: hysterectomy      Review of Systems:  CONSTITUTIONAL: No fever, chills, or fatigue  EYES: No eye pain, visual disturbances, or discharge  ENMT:  No difficulty hearing, tinnitus, vertigo; No sinus or throat pain  NECK: No pain or stiffness  RESPIRATORY: No cough, wheezing, chills or hemoptysis; No shortness of breath  CARDIOVASCULAR: No chest pain, palpitations, dizziness, or leg swelling  GASTROINTESTINAL: No abdominal or epigastric pain. No nausea, vomiting, or hematemesis; No diarrhea or constipation. No melena or hematochezia.  GENITOURINARY: No dysuria, frequency, hematuria, or incontinence  NEUROLOGICAL: No headaches, memory loss, loss of strength, numbness, or tremors  SKIN: No itching, burning, rashes, or lesions   MUSCULOSKELETAL: No joint pain or swelling; No muscle, back, or extremity pain  PSYCHIATRIC: No depression, anxiety, mood swings, or difficulty sleeping      Medications:  piperacillin/tazobactam IVPB. 3.375 Gram(s) IV Intermittent every 12 hours      ALBUTerol    0.083% 2.5 milliGRAM(s) Nebulizer every 6 hours PRN  ALBUTerol/ipratropium for Nebulization 3 milliLiter(s) Nebulizer every 6 hours PRN    QUEtiapine 25 milliGRAM(s) Oral at bedtime      aspirin  chewable 81 milliGRAM(s) Oral daily  heparin  Injectable 5000 Unit(s) SubCutaneous every 12 hours        hydrocortisone sodium succinate Injectable 50 milliGRAM(s) IV Push every 6 hours  insulin lispro (HumaLOG) corrective regimen sliding scale   SubCutaneous every 6 hours  levothyroxine 12.5 MICROGram(s) Oral daily    ascorbic acid IVPB 1500 milliGRAM(s) IV Intermittent every 6 hours  sodium chloride 0.45%. 1000 milliLiter(s) IV Continuous <Continuous>      BACItracin   Ointment 1 Application(s) Topical every 12 hours            ICU Vital Signs Last 24 Hrs  T(C): 36.7 (01 Apr 2018 07:21), Max: 36.7 (31 Mar 2018 16:00)  T(F): 98.1 (01 Apr 2018 07:21), Max: 98.1 (31 Mar 2018 16:00)  HR: 104 (01 Apr 2018 09:00) (70 - 104)  BP: 114/54 (01 Apr 2018 08:00) (95/61 - 121/61)  BP(mean): 77 (01 Apr 2018 08:00) (67 - 91)  ABP: 120/63 (01 Apr 2018 09:00) (83/42 - 123/60)  ABP(mean): 86 (01 Apr 2018 09:00) (59 - 97)  RR: 31 (01 Apr 2018 09:00) (16 - 41)  SpO2: 97% (01 Apr 2018 09:00) (66% - 100%)          I&O's Detail    31 Mar 2018 07:01  -  01 Apr 2018 07:00  --------------------------------------------------------  IN:    Oral Fluid: 120 mL    sodium chloride 0.45%.: 2040 mL    Solution: 150 mL    Solution: 400 mL  Total IN: 2710 mL    OUT:    Indwelling Catheter - Urethral: 150 mL  Total OUT: 150 mL    Total NET: 2560 mL      01 Apr 2018 07:01  -  01 Apr 2018 09:27  --------------------------------------------------------  IN:    sodium chloride 0.45%.: 170 mL    Solution: 50 mL  Total IN: 220 mL    OUT:  Total OUT: 0 mL    Total NET: 220 mL            LABS:                        9.9    12.2  )-----------( 192      ( 01 Apr 2018 08:25 )             29.6     04-01    148<H>  |  112<H>  |  19.0  ----------------------------<  121<H>  3.1<L>   |  20.0<L>  |  0.66    Ca    6.9<L>      01 Apr 2018 08:25  Phos  2.4     04-01  Mg     2.0     04-01            CAPILLARY BLOOD GLUCOSE      POCT Blood Glucose.: 118 mg/dL (01 Apr 2018 05:19)        CULTURES:  Culture Results:   Numerous Candida albicans  Numerous Routine respiratory venkat present  Culture in progress (03-30-18 @ 07:15)  Culture Results:   No growth (03-30-18 @ 05:47)  Culture Results:   No growth at 48 hours (03-29-18 @ 10:43)  Culture Results:   Growth in aerobic and anaerobic bottles: Coag Negative Staphylococcus  Aerobic Bottle: 20:08 Hours to positivity  Anaerobic Bottle: 21:08 Hours to positivity  ***Blood Panel PCR results on this specimen are available  approximately 3 hours after the Gram stain result.***  Gram stain, PCR, and/or culture results may not always  correspond due to difference in methodologies.  ************************************************************  This PCR assay was performed using [employer].  The following targets are tested for: Enterococcus,  vancomycin resistant enterococci, Listeria monocytogenes,  coagulase negative staphylococci, S. aureus,  methicillin resistant S. aureus, Streptococcus agalactiae  (Group B), S. pneumoniae, S. pyogenes (Group A),  Acinetobacter baumannii, Enterobacter cloacae, E. coli,  Klebsiella oxytoca, K. pneumoniae, Proteus sp.,  Serratia marcescens, Haemophilus influenzae,  Neisseria meningitidis, Pseudomonas aeruginosa, Candida  albicans, C. glabrata, C krusei, C parapsilosis,  C. tropicalis and the KPC resistance gene.  "Due to technical problems, Proteus sp. will Not be reported as part of  the BCID panel until further notice"  ,  TYPE: (C=Critical, N=Notification, A=Abnormal) c  TESTS:  _ gs  DATE/TIME CALLED: _ 03/30/2018 08:52:17  CALLED TO: Charly anthony rn  READ BACK (2 Patient Identifiers)(Y/N): _ y  READ BACK VALUES (Y/N): _ y  CALLED BY: Charly otto (03-29-18 @ 10:43)      Physical Examination:    General: No acute distress.  Alert, oriented, interactive, nonfocal    HEENT: Pupils equal, reactive to light.  Symmetric.    PULM: Clear to auscultation bilaterally, no significant sputum production    CVS: Regular rate and rhythm, no murmurs, rubs, or gallops    ABD: Soft, nondistended, nontender, normoactive bowel sounds, no masses    EXT: No edema, nontender    SKIN: Warm and well perfused, no rashes noted.    NEURO: A&Ox3, strength 5/5 all extremities, cranial nerves grossly intact, no focal deficits    RADIOLOGY: ***    CRITICAL CARE TIME SPENT: ***  Evaluating/treating patient, reviewing data/labs/imaging, discussing case with multidisciplinary team, discussing plan/goals of care with patient/family. Non-inclusive of procedure time. Patient is a 89y old  Female who presents with a chief complaint of AMS, (30 Mar 2018 01:47)      BRIEF HOSPITAL COURSE:  89 y/o female pmhx of HTN, Dementia, hypothyroidism admitted on 3/29 w/ septic shock 2/2 UTI and pneumonia  requiring short period of pressors, as well as acute hypoxic respiratory failure requiring intubation. Weaned off pressors and started on midodrine however she  had episodes of bradycardia on midodrine and it was discontinued.     Events last 24 hours:  She was extubated yesterday morning. Tolerating 2L NC overnight into this morning. She remains confused likely due to her underlying dementia. HD stable w/out any work of breathing.     PAST MEDICAL & SURGICAL HISTORY:  Dementia with behavioral disturbance, unspecified dementia type  Depression  Hypothyroidism  DJD (degenerative joint disease)  Hypertension  H/O: hysterectomy      Review of Systems:  CONSTITUTIONAL: No fever, chills, or fatigue  EYES: No eye pain, visual disturbances, or discharge  ENMT:  No difficulty hearing, tinnitus, vertigo; No sinus or throat pain  NECK: No pain or stiffness  RESPIRATORY: No cough, wheezing, chills or hemoptysis; No shortness of breath  CARDIOVASCULAR: No chest pain, palpitations, dizziness, or leg swelling  GASTROINTESTINAL: No abdominal or epigastric pain. No nausea, vomiting, or hematemesis; No diarrhea or constipation. No melena or hematochezia.  GENITOURINARY: No dysuria, frequency, hematuria, or incontinence  NEUROLOGICAL: No headaches, memory loss, loss of strength, numbness, or tremors  SKIN: No itching, burning, rashes, or lesions   MUSCULOSKELETAL: No joint pain or swelling; No muscle, back, or extremity pain  PSYCHIATRIC: No depression, anxiety, mood swings, or difficulty sleeping      Medications:  piperacillin/tazobactam IVPB. 3.375 Gram(s) IV Intermittent every 12 hours  ALBUTerol    0.083% 2.5 milliGRAM(s) Nebulizer every 6 hours PRN  ALBUTerol/ipratropium for Nebulization 3 milliLiter(s) Nebulizer every 6 hours PRN  QUEtiapine 25 milliGRAM(s) Oral at bedtime  aspirin  chewable 81 milliGRAM(s) Oral daily  heparin  Injectable 5000 Unit(s) SubCutaneous every 12 hours  hydrocortisone sodium succinate Injectable 50 milliGRAM(s) IV Push every 6 hours  insulin lispro (HumaLOG) corrective regimen sliding scale   SubCutaneous every 6 hours  levothyroxine 12.5 MICROGram(s) Oral daily  ascorbic acid IVPB 1500 milliGRAM(s) IV Intermittent every 6 hours  sodium chloride 0.45%. 1000 milliLiter(s) IV Continuous <Continuous>  BACItracin   Ointment 1 Application(s) Topical every 12 hours            ICU Vital Signs Last 24 Hrs  T(C): 36.7 (01 Apr 2018 07:21), Max: 36.7 (31 Mar 2018 16:00)  T(F): 98.1 (01 Apr 2018 07:21), Max: 98.1 (31 Mar 2018 16:00)  HR: 104 (01 Apr 2018 09:00) (70 - 104)  BP: 114/54 (01 Apr 2018 08:00) (95/61 - 121/61)  BP(mean): 77 (01 Apr 2018 08:00) (67 - 91)  ABP: 120/63 (01 Apr 2018 09:00) (83/42 - 123/60)  ABP(mean): 86 (01 Apr 2018 09:00) (59 - 97)  RR: 31 (01 Apr 2018 09:00) (16 - 41)  SpO2: 97% (01 Apr 2018 09:00) (66% - 100%)          I&O's Detail    31 Mar 2018 07:01  -  01 Apr 2018 07:00  --------------------------------------------------------  IN:    Oral Fluid: 120 mL    sodium chloride 0.45%.: 2040 mL    Solution: 150 mL    Solution: 400 mL  Total IN: 2710 mL    OUT:    Indwelling Catheter - Urethral: 150 mL  Total OUT: 150 mL    Total NET: 2560 mL      01 Apr 2018 07:01  -  01 Apr 2018 09:27  --------------------------------------------------------  IN:    sodium chloride 0.45%.: 170 mL    Solution: 50 mL  Total IN: 220 mL    OUT:  Total OUT: 0 mL    Total NET: 220 mL            LABS:                        9.9    12.2  )-----------( 192      ( 01 Apr 2018 08:25 )             29.6     04-01    148<H>  |  112<H>  |  19.0  ----------------------------<  121<H>  3.1<L>   |  20.0<L>  |  0.66    Ca    6.9<L>      01 Apr 2018 08:25  Phos  2.4     04-01  Mg     2.0     04-01            CAPILLARY BLOOD GLUCOSE      POCT Blood Glucose.: 118 mg/dL (01 Apr 2018 05:19)        CULTURES:  Culture Results:   Numerous Candida albicans  Numerous Routine respiratory venkat present  Culture in progress (03-30-18 @ 07:15)  Culture Results:   No growth (03-30-18 @ 05:47)  Culture Results:   No growth at 48 hours (03-29-18 @ 10:43)  Culture Results:   Growth in aerobic and anaerobic bottles: Coag Negative Staphylococcus  Aerobic Bottle: 20:08 Hours to positivity  Anaerobic Bottle: 21:08 Hours to positivity  ***Blood Panel PCR results on this specimen are available  approximately 3 hours after the Gram stain result.***  Gram stain, PCR, and/or culture results may not always  correspond due to difference in methodologies.  ************************************************************  This PCR assay was performed using [employer].  The following targets are tested for: Enterococcus,  vancomycin resistant enterococci, Listeria monocytogenes,  coagulase negative staphylococci, S. aureus,  methicillin resistant S. aureus, Streptococcus agalactiae  (Group B), S. pneumoniae, S. pyogenes (Group A),  Acinetobacter baumannii, Enterobacter cloacae, E. coli,  Klebsiella oxytoca, K. pneumoniae, Proteus sp.,  Serratia marcescens, Haemophilus influenzae,  Neisseria meningitidis, Pseudomonas aeruginosa, Candida  albicans, C. glabrata, C krusei, C parapsilosis,  C. tropicalis and the KPC resistance gene.  "Due to technical problems, Proteus sp. will Not be reported as part of  the BCID panel until further notice"  ,  TYPE: (C=Critical, N=Notification, A=Abnormal) c  TESTS:  _ gs  DATE/TIME CALLED: _ 03/30/2018 08:52:17  CALLED TO: Charly anthony rn  READ BACK (2 Patient Identifiers)(Y/N): _ y  READ BACK VALUES (Y/N): _ y  CALLED BY: Charly otto (03-29-18 @ 10:43)      Physical Examination:    General: AAOx1. No acute distress     HEENT: Pupils equal, reactive to light.  Symmetric.    PULM: Clear to auscultation bilaterally, no significant sputum production    CVS: Regular rate and rhythm, no murmurs, rubs, or gallops    ABD: Soft, nondistended, nontender, normoactive bowel sounds, no masses    EXT: No edema, nontender    SKIN: Warm and well perfused, no rashes noted.    NEURO: A&Ox3, strength 5/5 all extremities, cranial nerves grossly intact, no focal deficits    RADIOLOGY: ***    CRITICAL CARE TIME SPENT: ***  Evaluating/treating patient, reviewing data/labs/imaging, discussing case with multidisciplinary team, discussing plan/goals of care with patient/family. Non-inclusive of procedure time. Patient is a 89y old  Female who presents with a chief complaint of AMS, (30 Mar 2018 01:47)      BRIEF HOSPITAL COURSE:  89 y/o female pmhx of HTN, Dementia, hypothyroidism admitted on 3/29 w/ septic shock 2/2 UTI and pneumonia  requiring short period of pressors, as well as acute hypoxic respiratory failure requiring intubation. Weaned off pressors and started on midodrine however she  had episodes of bradycardia on midodrine and it was discontinued.     Events last 24 hours:  She was extubated yesterday morning. Tolerating 2L NC overnight into this morning. She remains confused likely due to her underlying dementia. HD stable w/out any work of breathing.     PAST MEDICAL & SURGICAL HISTORY:  Dementia with behavioral disturbance, unspecified dementia type  Depression  Hypothyroidism  DJD (degenerative joint disease)  Hypertension  H/O: hysterectomy      Review of Systems:  CONSTITUTIONAL: No fever, chills, or fatigue  EYES: No eye pain, visual disturbances, or discharge  ENMT:  No difficulty hearing, tinnitus, vertigo; No sinus or throat pain  NECK: No pain or stiffness  RESPIRATORY: No cough, wheezing, chills or hemoptysis; No shortness of breath  CARDIOVASCULAR: No chest pain, palpitations, dizziness, or leg swelling  GASTROINTESTINAL: No abdominal or epigastric pain. No nausea, vomiting, or hematemesis; No diarrhea or constipation. No melena or hematochezia.  GENITOURINARY: No dysuria, frequency, hematuria, or incontinence  NEUROLOGICAL: No headaches, memory loss, loss of strength, numbness, or tremors  SKIN: No itching, burning, rashes, or lesions   MUSCULOSKELETAL: No joint pain or swelling; No muscle, back, or extremity pain  PSYCHIATRIC: No depression, anxiety, mood swings, or difficulty sleeping      Medications:  piperacillin/tazobactam IVPB. 3.375 Gram(s) IV Intermittent every 12 hours  ALBUTerol    0.083% 2.5 milliGRAM(s) Nebulizer every 6 hours PRN  ALBUTerol/ipratropium for Nebulization 3 milliLiter(s) Nebulizer every 6 hours PRN  QUEtiapine 25 milliGRAM(s) Oral at bedtime  aspirin  chewable 81 milliGRAM(s) Oral daily  heparin  Injectable 5000 Unit(s) SubCutaneous every 12 hours  hydrocortisone sodium succinate Injectable 50 milliGRAM(s) IV Push every 6 hours  insulin lispro (HumaLOG) corrective regimen sliding scale   SubCutaneous every 6 hours  levothyroxine 12.5 MICROGram(s) Oral daily  ascorbic acid IVPB 1500 milliGRAM(s) IV Intermittent every 6 hours  sodium chloride 0.45%. 1000 milliLiter(s) IV Continuous <Continuous>  BACItracin   Ointment 1 Application(s) Topical every 12 hours            ICU Vital Signs Last 24 Hrs  T(C): 36.7 (01 Apr 2018 07:21), Max: 36.7 (31 Mar 2018 16:00)  T(F): 98.1 (01 Apr 2018 07:21), Max: 98.1 (31 Mar 2018 16:00)  HR: 104 (01 Apr 2018 09:00) (70 - 104)  BP: 114/54 (01 Apr 2018 08:00) (95/61 - 121/61)  BP(mean): 77 (01 Apr 2018 08:00) (67 - 91)  ABP: 120/63 (01 Apr 2018 09:00) (83/42 - 123/60)  ABP(mean): 86 (01 Apr 2018 09:00) (59 - 97)  RR: 31 (01 Apr 2018 09:00) (16 - 41)  SpO2: 97% (01 Apr 2018 09:00) (66% - 100%)          I&O's Detail    31 Mar 2018 07:01  -  01 Apr 2018 07:00  --------------------------------------------------------  IN:    Oral Fluid: 120 mL    sodium chloride 0.45%.: 2040 mL    Solution: 150 mL    Solution: 400 mL  Total IN: 2710 mL    OUT:    Indwelling Catheter - Urethral: 150 mL  Total OUT: 150 mL    Total NET: 2560 mL      01 Apr 2018 07:01  -  01 Apr 2018 09:27  --------------------------------------------------------  IN:    sodium chloride 0.45%.: 170 mL    Solution: 50 mL  Total IN: 220 mL    OUT:  Total OUT: 0 mL    Total NET: 220 mL            LABS:                        9.9    12.2  )-----------( 192      ( 01 Apr 2018 08:25 )             29.6     04-01    148<H>  |  112<H>  |  19.0  ----------------------------<  121<H>  3.1<L>   |  20.0<L>  |  0.66    Ca    6.9<L>      01 Apr 2018 08:25  Phos  2.4     04-01  Mg     2.0     04-01            CAPILLARY BLOOD GLUCOSE      POCT Blood Glucose.: 118 mg/dL (01 Apr 2018 05:19)        CULTURES:  Culture Results:   Numerous Candida albicans  Numerous Routine respiratory venkat present  Culture in progress (03-30-18 @ 07:15)  Culture Results:   No growth (03-30-18 @ 05:47)  Culture Results:   No growth at 48 hours (03-29-18 @ 10:43)  Culture Results:   Growth in aerobic and anaerobic bottles: Coag Negative Staphylococcus  Aerobic Bottle: 20:08 Hours to positivity  Anaerobic Bottle: 21:08 Hours to positivity  ***Blood Panel PCR results on this specimen are available  approximately 3 hours after the Gram stain result.***  Gram stain, PCR, and/or culture results may not always  correspond due to difference in methodologies.  ************************************************************  This PCR assay was performed using [employer].  The following targets are tested for: Enterococcus,  vancomycin resistant enterococci, Listeria monocytogenes,  coagulase negative staphylococci, S. aureus,  methicillin resistant S. aureus, Streptococcus agalactiae  (Group B), S. pneumoniae, S. pyogenes (Group A),  Acinetobacter baumannii, Enterobacter cloacae, E. coli,  Klebsiella oxytoca, K. pneumoniae, Proteus sp.,  Serratia marcescens, Haemophilus influenzae,  Neisseria meningitidis, Pseudomonas aeruginosa, Candida  albicans, C. glabrata, C krusei, C parapsilosis,  C. tropicalis and the KPC resistance gene.  "Due to technical problems, Proteus sp. will Not be reported as part of  the BCID panel until further notice"  ,  TYPE: (C=Critical, N=Notification, A=Abnormal) c  TESTS:  _ gs  DATE/TIME CALLED: _ 03/30/2018 08:52:17  CALLED TO: Charly anthony rn  READ BACK (2 Patient Identifiers)(Y/N): _ y  READ BACK VALUES (Y/N): _ y  CALLED BY: Charly otto (03-29-18 @ 10:43)      Physical Examination:    General: AAOx1. No acute distress     HEENT: Pupils equal, reactive to light.  Symmetric.    PULM: Decreased breath sounds at the bases. b/l coarse breath sounds. No wheeze/rales. No accessory muscle use     CVS: +S1/S2. NSR. no murmurs . NO JVD     ABD: Soft, nondistended, nontender, normoactive bowel sounds, no masses    EXT: No edema, nontender    SKIN: Warm and well perfused, no rashes noted.    NEURO: A&Ox1, strength 5/5 all extremities, no focal deficits    RADIOLOGY: < from: Xray Chest 1 View-PORTABLE IMMEDIATE (03.30.18 @ 07:36) >  INTERPRETATION:  Exam Date: 3/30/2018 7:36 AM    Chest radiograph (one view)         CLINICAL INFORMATION:  CVC placement    TECHNIQUE:  Single frontalview of the chest was obtained.    COMPARISON: Radiograph same day    FINDINGS/  IMPRESSION:      Tubes and lines are unchanged.    No focal consolidation. No significant pleural effusions.   Cardiomediastinal silhouette is intact.    < end of copied text >     TIME SPENT: 35 min   Evaluating/treating patient, reviewing data/labs/imaging, discussing case with multidisciplinary team, discussing plan/goals of care with patient/family. Non-inclusive of procedure time.

## 2018-04-01 NOTE — PROGRESS NOTE ADULT - PROBLEM SELECTOR PLAN 1
Resolved, HD stable off pressors.   One bottle of blood cultures growing GPC, likely containment. Repeat cultures   pending .  c/w zosyn for PNA/ UTI Resolved, HD stable off pressors.   One bottle of blood cultures growing GPC, likely containment. Repeat cultures   pending .  c/w zosyn for PNA/ UTI  taper off steroids  [facility] Vit [facility]

## 2018-04-01 NOTE — PROGRESS NOTE ADULT - PROBLEM SELECTOR PLAN 5
Normotensive now  Would restart home medications as appropraite c/w zosyn  Chest PT  Incentive spirometry  Duonebs q6hr

## 2018-04-01 NOTE — PROGRESS NOTE ADULT - PROBLEM SELECTOR PLAN 2
resolved with IVF hydration  Monitor I &O's delirium on Dementia  Possibly 2/2 toxic metabolic encephalopathy - uncleat baseline. will discuss with family.   Ct supportive care, treat infection, taper off steroids

## 2018-04-01 NOTE — PROGRESS NOTE ADULT - ASSESSMENT
89 y/o female pmhx of HTN, DM, Dementia, hypothyroidism admitted on 3/29 w/ septic shock 2/2 UTI and pneumonia  requiring short period of pressors, as well as acute hypoxic respiratory failure requiring intubation. Weaned off pressors and started on midodrine however she  had episodes of bradycardia on midodrine and it was discontinued. She was extubated on 3/31 and has done well on NC. She also had GRAY< hypernatremia which has improved with iv fluid resuscitation. transferred out of MICU 3/31. Blood cultures 3/29 - 1/2 - grew coag Neg staph - possibly contaminant.

## 2018-04-02 DIAGNOSIS — I48.2 CHRONIC ATRIAL FIBRILLATION: ICD-10-CM

## 2018-04-02 DIAGNOSIS — I10 ESSENTIAL (PRIMARY) HYPERTENSION: ICD-10-CM

## 2018-04-02 LAB
ALBUMIN SERPL ELPH-MCNC: 2.6 G/DL — LOW (ref 3.3–5.2)
ALP SERPL-CCNC: 47 U/L — SIGNIFICANT CHANGE UP (ref 40–120)
ALT FLD-CCNC: 25 U/L — SIGNIFICANT CHANGE UP
ANION GAP SERPL CALC-SCNC: 15 MMOL/L — SIGNIFICANT CHANGE UP (ref 5–17)
AST SERPL-CCNC: 29 U/L — SIGNIFICANT CHANGE UP
BILIRUB SERPL-MCNC: 0.5 MG/DL — SIGNIFICANT CHANGE UP (ref 0.4–2)
BUN SERPL-MCNC: 16 MG/DL — SIGNIFICANT CHANGE UP (ref 8–20)
CALCIUM SERPL-MCNC: 7.6 MG/DL — LOW (ref 8.6–10.2)
CHLORIDE SERPL-SCNC: 112 MMOL/L — HIGH (ref 98–107)
CO2 SERPL-SCNC: 19 MMOL/L — LOW (ref 22–29)
CREAT SERPL-MCNC: 0.52 MG/DL — SIGNIFICANT CHANGE UP (ref 0.5–1.3)
EOSINOPHIL # BLD AUTO: 0 K/UL — SIGNIFICANT CHANGE UP (ref 0–0.5)
EOSINOPHIL NFR BLD AUTO: 0.1 % — SIGNIFICANT CHANGE UP (ref 0–6)
GLUCOSE BLDC GLUCOMTR-MCNC: 69 MG/DL — LOW (ref 70–99)
GLUCOSE BLDC GLUCOMTR-MCNC: 74 MG/DL — SIGNIFICANT CHANGE UP (ref 70–99)
GLUCOSE BLDC GLUCOMTR-MCNC: 80 MG/DL — SIGNIFICANT CHANGE UP (ref 70–99)
GLUCOSE BLDC GLUCOMTR-MCNC: 80 MG/DL — SIGNIFICANT CHANGE UP (ref 70–99)
GLUCOSE BLDC GLUCOMTR-MCNC: 83 MG/DL — SIGNIFICANT CHANGE UP (ref 70–99)
GLUCOSE BLDC GLUCOMTR-MCNC: 84 MG/DL — SIGNIFICANT CHANGE UP (ref 70–99)
GLUCOSE BLDC GLUCOMTR-MCNC: 85 MG/DL — SIGNIFICANT CHANGE UP (ref 70–99)
GLUCOSE BLDC GLUCOMTR-MCNC: 99 MG/DL — SIGNIFICANT CHANGE UP (ref 70–99)
GLUCOSE SERPL-MCNC: 88 MG/DL — SIGNIFICANT CHANGE UP (ref 70–115)
HCT VFR BLD CALC: 32.6 % — LOW (ref 37–47)
HGB BLD-MCNC: 10.6 G/DL — LOW (ref 12–16)
LYMPHOCYTES # BLD AUTO: 18.2 % — LOW (ref 20–55)
LYMPHOCYTES # BLD AUTO: 2.2 K/UL — SIGNIFICANT CHANGE UP (ref 1–4.8)
MAGNESIUM SERPL-MCNC: 1.9 MG/DL — SIGNIFICANT CHANGE UP (ref 1.6–2.6)
MCHC RBC-ENTMCNC: 31.6 PG — HIGH (ref 27–31)
MCHC RBC-ENTMCNC: 32.5 G/DL — SIGNIFICANT CHANGE UP (ref 32–36)
MCV RBC AUTO: 97.3 FL — SIGNIFICANT CHANGE UP (ref 81–99)
MONOCYTES # BLD AUTO: 0.8 K/UL — SIGNIFICANT CHANGE UP (ref 0–0.8)
MONOCYTES NFR BLD AUTO: 7.1 % — SIGNIFICANT CHANGE UP (ref 3–10)
NEUTROPHILS # BLD AUTO: 8.8 K/UL — HIGH (ref 1.8–8)
NEUTROPHILS NFR BLD AUTO: 74.3 % — HIGH (ref 37–73)
NT-PROBNP SERPL-SCNC: HIGH PG/ML (ref 0–300)
PHOSPHATE SERPL-MCNC: 2 MG/DL — LOW (ref 2.4–4.7)
PLATELET # BLD AUTO: 195 K/UL — SIGNIFICANT CHANGE UP (ref 150–400)
POTASSIUM SERPL-MCNC: 3.2 MMOL/L — LOW (ref 3.5–5.3)
POTASSIUM SERPL-SCNC: 3.2 MMOL/L — LOW (ref 3.5–5.3)
PROT SERPL-MCNC: 5.1 G/DL — LOW (ref 6.6–8.7)
RBC # BLD: 3.35 M/UL — LOW (ref 4.4–5.2)
RBC # FLD: 14.2 % — SIGNIFICANT CHANGE UP (ref 11–15.6)
SODIUM SERPL-SCNC: 146 MMOL/L — HIGH (ref 135–145)
WBC # BLD: 11.8 K/UL — HIGH (ref 4.8–10.8)
WBC # FLD AUTO: 11.8 K/UL — HIGH (ref 4.8–10.8)

## 2018-04-02 PROCEDURE — 71045 X-RAY EXAM CHEST 1 VIEW: CPT | Mod: 26

## 2018-04-02 PROCEDURE — 93010 ELECTROCARDIOGRAM REPORT: CPT

## 2018-04-02 PROCEDURE — 99223 1ST HOSP IP/OBS HIGH 75: CPT

## 2018-04-02 PROCEDURE — 93306 TTE W/DOPPLER COMPLETE: CPT | Mod: 26

## 2018-04-02 PROCEDURE — 99233 SBSQ HOSP IP/OBS HIGH 50: CPT

## 2018-04-02 RX ORDER — POTASSIUM CHLORIDE 20 MEQ
40 PACKET (EA) ORAL EVERY 4 HOURS
Qty: 0 | Refills: 0 | Status: DISCONTINUED | OUTPATIENT
Start: 2018-04-02 | End: 2018-04-02

## 2018-04-02 RX ORDER — LEVALBUTEROL 1.25 MG/.5ML
0.63 SOLUTION, CONCENTRATE RESPIRATORY (INHALATION) EVERY 6 HOURS
Qty: 0 | Refills: 0 | Status: DISCONTINUED | OUTPATIENT
Start: 2018-04-02 | End: 2018-04-06

## 2018-04-02 RX ORDER — DEXTROSE 50 % IN WATER 50 %
25 SYRINGE (ML) INTRAVENOUS ONCE
Qty: 0 | Refills: 0 | Status: DISCONTINUED | OUTPATIENT
Start: 2018-04-02 | End: 2018-04-04

## 2018-04-02 RX ORDER — SACCHAROMYCES BOULARDII 250 MG
250 POWDER IN PACKET (EA) ORAL
Qty: 0 | Refills: 0 | Status: DISCONTINUED | OUTPATIENT
Start: 2018-04-02 | End: 2018-04-06

## 2018-04-02 RX ORDER — GLUCAGON INJECTION, SOLUTION 0.5 MG/.1ML
1 INJECTION, SOLUTION SUBCUTANEOUS ONCE
Qty: 0 | Refills: 0 | Status: DISCONTINUED | OUTPATIENT
Start: 2018-04-02 | End: 2018-04-04

## 2018-04-02 RX ORDER — ENOXAPARIN SODIUM 100 MG/ML
60 INJECTION SUBCUTANEOUS ONCE
Qty: 0 | Refills: 0 | Status: COMPLETED | OUTPATIENT
Start: 2018-04-02 | End: 2018-04-02

## 2018-04-02 RX ORDER — POTASSIUM PHOSPHATE, MONOBASIC POTASSIUM PHOSPHATE, DIBASIC 236; 224 MG/ML; MG/ML
15 INJECTION, SOLUTION INTRAVENOUS ONCE
Qty: 0 | Refills: 0 | Status: COMPLETED | OUTPATIENT
Start: 2018-04-02 | End: 2018-04-02

## 2018-04-02 RX ORDER — SODIUM CHLORIDE 9 MG/ML
1000 INJECTION, SOLUTION INTRAVENOUS
Qty: 0 | Refills: 0 | Status: DISCONTINUED | OUTPATIENT
Start: 2018-04-02 | End: 2018-04-04

## 2018-04-02 RX ORDER — POTASSIUM CHLORIDE 20 MEQ
40 PACKET (EA) ORAL ONCE
Qty: 0 | Refills: 0 | Status: COMPLETED | OUTPATIENT
Start: 2018-04-02 | End: 2018-04-02

## 2018-04-02 RX ORDER — METOPROLOL TARTRATE 50 MG
5 TABLET ORAL ONCE
Qty: 0 | Refills: 0 | Status: COMPLETED | OUTPATIENT
Start: 2018-04-02 | End: 2018-04-02

## 2018-04-02 RX ORDER — DEXTROSE 50 % IN WATER 50 %
25 SYRINGE (ML) INTRAVENOUS ONCE
Qty: 0 | Refills: 0 | Status: COMPLETED | OUTPATIENT
Start: 2018-04-02 | End: 2018-04-02

## 2018-04-02 RX ORDER — POTASSIUM CHLORIDE 20 MEQ
10 PACKET (EA) ORAL
Qty: 0 | Refills: 0 | Status: DISCONTINUED | OUTPATIENT
Start: 2018-04-02 | End: 2018-04-02

## 2018-04-02 RX ORDER — DEXTROSE 50 % IN WATER 50 %
1 SYRINGE (ML) INTRAVENOUS ONCE
Qty: 0 | Refills: 0 | Status: DISCONTINUED | OUTPATIENT
Start: 2018-04-02 | End: 2018-04-04

## 2018-04-02 RX ORDER — HEPARIN SODIUM 5000 [USP'U]/ML
5000 INJECTION INTRAVENOUS; SUBCUTANEOUS EVERY 12 HOURS
Qty: 0 | Refills: 0 | Status: DISCONTINUED | OUTPATIENT
Start: 2018-04-02 | End: 2018-04-03

## 2018-04-02 RX ORDER — DEXTROSE 50 % IN WATER 50 %
1 SYRINGE (ML) INTRAVENOUS ONCE
Qty: 0 | Refills: 0 | Status: COMPLETED | OUTPATIENT
Start: 2018-04-02 | End: 2018-04-02

## 2018-04-02 RX ORDER — HALOPERIDOL DECANOATE 100 MG/ML
1 INJECTION INTRAMUSCULAR ONCE
Qty: 0 | Refills: 0 | Status: COMPLETED | OUTPATIENT
Start: 2018-04-02 | End: 2018-04-03

## 2018-04-02 RX ORDER — DEXTROSE 50 % IN WATER 50 %
12.5 SYRINGE (ML) INTRAVENOUS ONCE
Qty: 0 | Refills: 0 | Status: DISCONTINUED | OUTPATIENT
Start: 2018-04-02 | End: 2018-04-04

## 2018-04-02 RX ADMIN — HEPARIN SODIUM 5000 UNIT(S): 5000 INJECTION INTRAVENOUS; SUBCUTANEOUS at 05:26

## 2018-04-02 RX ADMIN — Medication 103 MILLIGRAM(S): at 05:25

## 2018-04-02 RX ADMIN — Medication 81 MILLIGRAM(S): at 11:47

## 2018-04-02 RX ADMIN — Medication 1 APPLICATION(S): at 19:39

## 2018-04-02 RX ADMIN — ENOXAPARIN SODIUM 60 MILLIGRAM(S): 100 INJECTION SUBCUTANEOUS at 06:19

## 2018-04-02 RX ADMIN — Medication 12.5 MICROGRAM(S): at 05:26

## 2018-04-02 RX ADMIN — Medication 5 MILLIGRAM(S): at 11:09

## 2018-04-02 RX ADMIN — HEPARIN SODIUM 5000 UNIT(S): 5000 INJECTION INTRAVENOUS; SUBCUTANEOUS at 19:39

## 2018-04-02 RX ADMIN — POTASSIUM PHOSPHATE, MONOBASIC POTASSIUM PHOSPHATE, DIBASIC 62.5 MILLIMOLE(S): 236; 224 INJECTION, SOLUTION INTRAVENOUS at 06:18

## 2018-04-02 RX ADMIN — Medication 1 APPLICATION(S): at 05:26

## 2018-04-02 RX ADMIN — Medication 25 MILLILITER(S): at 22:53

## 2018-04-02 RX ADMIN — Medication 40 MILLIEQUIVALENT(S): at 06:25

## 2018-04-02 RX ADMIN — Medication 5 MILLIGRAM(S): at 05:00

## 2018-04-02 RX ADMIN — Medication 100 MILLIEQUIVALENT(S): at 14:04

## 2018-04-02 RX ADMIN — Medication 103 MILLIGRAM(S): at 18:57

## 2018-04-02 RX ADMIN — PIPERACILLIN AND TAZOBACTAM 25 GRAM(S): 4; .5 INJECTION, POWDER, LYOPHILIZED, FOR SOLUTION INTRAVENOUS at 20:05

## 2018-04-02 RX ADMIN — Medication 3 MILLILITER(S): at 03:37

## 2018-04-02 RX ADMIN — Medication 1 DOSE(S): at 14:10

## 2018-04-02 NOTE — CHART NOTE - NSCHARTNOTEFT_GEN_A_CORE
Rapid Response     89 y/o female pmhx of HTN, DM, Dementia, hypothyroidism admitted on 3/29 w/ septic shock 2/2 UTI and pneumonia  requiring short period of pressors, as well as acute hypoxic respiratory failure requiring intubation. Weaned off pressors and started on midodrine however she  had episodes of bradycardia on midodrine and it was discontinued. SHe was extubated on 3/31 and has done well on NC. she is stable for transfer out of MICU. Rapid Response     89 y/o female pmhx of HTN, DM, Dementia, hypothyroidism admitted on 3/29 w/ septic shock 2/2 UTI and pneumonia  requiring short period of pressors, as well as acute hypoxic respiratory failure requiring intubation. Weaned off pressors and started on midodrine however she  had episodes of bradycardia on midodrine and it was discontinued. She was extubated on 3/31 and was transferred out of the ICU on 4/1.  She had an episode of SVT while in the ICU and was treated with adenosine.  She has been having multiple episodes of tachycardia and bradycardia while admitted.  No history of Afib prior to admission.  Rapid called for .      Vitals: , /84, RR16, SpO2 98% RA, T 98  Physical Exam:   General: NAD   HEENT: AT/NC, PERRLA   CV: S1, S2, no murmurs appreciated   Resp: clear bilateral, no wheezing, no rales   Abd: soft, NT, ND, +BS   Ext: B/L UE wrapped in guaze with weeping skin tears, b/l pedal edema   Neuro: A&Ox3     Labs:                         10.6   11.8  )-----------( 195      ( 02 Apr 2018 04:41 )             32.6   04-01    148<H>  |  112<H>  |  19.0  ----------------------------<  121<H>  3.1<L>   |  20.0<L>  |  0.66    Ca    6.9<L>      01 Apr 2018 08:25  Phos  2.4     04-01  Mg     2.0     04-01    A/P: 89 y/o female pmhx of HTN, DM, Dementia, hypothyroidism admitted on 3/29 w/ septic shock 2/2 UTI and pneumonia now with Afib.    Lopressor 5mg IVP x 1  EKG: Afib with pvc   Cardizem 5mg IVP x1 given and rhythm sustained between 100-120.  Pt is asymptomatic.   CBC, CMP, Mag and Phos ordered to look for electrolyte disturbances   TTE ordered   [place] [facility] consulted for AM   Cardizem 30po BID   Lovenox FD x1 (to be addressed by primary team in AM if pt is a candidate for long term AC)   Neb treatments changed to xoponex   Discussed case with Dr. Carrillo and in full agreement with plan.   Renetta, PGY

## 2018-04-02 NOTE — PROGRESS NOTE ADULT - ASSESSMENT
89 y/o female pmhx of HTN, DM, Dementia, hypothyroidism admitted on 3/29 w/ septic shock 2/2 UTI and pneumonia  requiring short period of pressors, as well as acute hypoxic respiratory failure requiring intubation. Weaned off pressors and started on midodrine however she  had episodes of bradycardia on midodrine and it was discontinued. She was extubated on 3/31 and has done well on NC. She also had GRAY< hypernatremia which has improved with iv fluid resuscitation. transferred out of MICU 3/31. Blood cultures 3/29 - 1/2 - grew coag Neg staph - possibly contaminant.        Problem/Plan - 1:  ·  Problem: Septic shock.  Plan: Resolved, HD stable off pressors.   One bottle of blood cultures growing GPC, likely containment. Repeat cultures   pending .  c/w zosyn for PNA/ UTI  on Vit C per ICU team - [facility] protocol.      Problem/Plan - 2:  ·  Problem: Encephalopathy acute. Plan: delirium on Dementia  Possibly 2/2 toxic metabolic encephalopathy - as per Chantel was ambulating with walker one week PTA  Ct supportive care, treat infection, taper off steroids. Discussion with Chantel, Palliative care consult, DNR obtained.      Problem/Plan - 3:  ·  Problem: GRAY (acute kidney injury). Plan: resolved with IVF hydration, monitor BMP.        Problem/Plan - 4:  ·  Problem: UTI (urinary tract infection). Plan: c/w zosyn.     Problem/Plan - 5:  ·  Problem: Lobar pneumonia. Plan: c/w zosyn  Chest PT  Incentive spirometry  Duonebs q6hr.     Problem/Plan - 6:  Problem: New onset AFIBB. Plan:  Cardio consult, increase Cardizem to 30 mg QID, defer AC to Cardiology.      Problem/Plan - 7:  ·  Problem: Dementia with behavioral disturbance, unspecified dementia type. Plan: c/w Seroquel. Palliative Consult, patient DNR     Problem/Plan - 8:  ·  Problem: Pleural Effusion.  Plan: D/C IV fluids    Problem/Plan-9:  Problem: Dysphagia. Plan: Nectar thickened liquids. Aspiration precautions 89 y/o female pmhx of HTN, DM, Dementia, hypothyroidism admitted on 3/29 w/ septic shock 2/2 UTI and pneumonia  requiring short period of pressors, as well as acute hypoxic respiratory failure requiring intubation. Weaned off pressors and started on midodrine however she  had episodes of bradycardia on midodrine and it was discontinued. She was extubated on 3/31 and has done well on NC. She also had GRAY< hypernatremia which has improved with iv fluid resuscitation. transferred out of MICU 3/31. Blood cultures 3/29 - 1/2 - grew coag Neg staph - possibly contaminant. Had rapid response on 4/1 - for rapid afib, received iv lopressor, cardio consulted.        Problem/Plan - 1:  ·  Problem: Septic shock.  Plan: Resolved, HD stable off pressors.   One bottle of blood cultures growing GPC, likely containment. Repeat cultures   pending .  c/w zosyn for PNA/ UTI  on Vit C per ICU team - [facility] protocol.      Problem/Plan - 2:  ·  Problem: Encephalopathy acute. Plan: delirium on Dementia  Possibly 2/2 toxic metabolic encephalopathy - as per Chantel was ambulating with walker one week PTA  Ct supportive care, treat infection, taper off steroids. Discussion with Chantel, Palliative care consult, DNR obtained.      Problem/Plan - 3:  ·  Problem: GRAY (acute kidney injury). Plan: resolved with IVF hydration, monitor BMP.        Problem/Plan - 4:  ·  Problem: UTI (urinary tract infection). Plan: c/w zosyn.     Problem/Plan - 5:  ·  Problem: Lobar pneumonia. Plan: c/w zosyn  Chest PT  Incentive spirometry  Duonebs q6hr.     Problem/Plan - 6:  Problem: New onset AFIBB. Plan:  Cardio consult, increase Cardizem to 30 mg QID, defer AC to Cardiology.      Problem/Plan - 7:  ·  Problem: Dementia with behavioral disturbance, unspecified dementia type. Plan: c/w Seroquel. Palliative Consult, patient DNR     Problem/Plan - 8:  ·  Problem: Pleural Effusion.  Plan: D/C IV fluids    Problem/Plan-9:  Problem: Dysphagia. Plan: Nectar thickened liquids. Aspiration precautions

## 2018-04-02 NOTE — CHART NOTE - NSCHARTNOTEFT_GEN_A_CORE
Source: Patient [ ]  Family [ ]   other [x ]    Current Diet: Diet, Dysphagia 2 Mechanical Soft-Nectar Consistency Fluid (03-31-18 @ 12:48)      Patient reports [ ] nausea  [ ] vomiting [ ] diarrhea [ ] constipation  [ x]chewing problems [x ] swallowing issues  [ ] other:     PO intake:  < 50% [x ]   50-75%  [ ]   %  [ ]  other :    Source for PO intake [ ] Patient [ ] family [x ] chart [ x] staff [ ] other    Enteral /Parenteral Nutrition:     Current Weight: 4/1:  67 kg   3/31: 67kg     % Weight Change     Pertinent Medications: MEDICATIONS  (STANDING):  ascorbic acid IVPB 1500 milliGRAM(s) IV Intermittent every 6 hours  aspirin  chewable 81 milliGRAM(s) Oral daily  BACItracin   Ointment 1 Application(s) Topical every 12 hours  diltiazem    Tablet 30 milliGRAM(s) Oral two times a day  insulin lispro (HumaLOG) corrective regimen sliding scale   SubCutaneous every 6 hours  levothyroxine 12.5 MICROGram(s) Oral daily  metoprolol tartrate Injectable 5 milliGRAM(s) IV Push once  piperacillin/tazobactam IVPB. 3.375 Gram(s) IV Intermittent every 12 hours  potassium chloride  10 mEq/100 mL IVPB 10 milliEquivalent(s) IV Intermittent every 1 hour  QUEtiapine 25 milliGRAM(s) Oral at bedtime    MEDICATIONS  (PRN):  ALBUTerol/ipratropium for Nebulization 3 milliLiter(s) Nebulizer every 6 hours PRN Shortness of Breath  levalbuterol Inhalation 0.63 milliGRAM(s) Inhalation every 6 hours PRN SOB    Pertinent Labs: CBC Full  -  ( 02 Apr 2018 04:41 )  WBC Count : 11.8 K/uL  Hemoglobin : 10.6 g/dL  Hematocrit : 32.6 %  Platelet Count - Automated : 195 K/uL  Mean Cell Volume : 97.3 fl  Mean Cell Hemoglobin : 31.6 pg  Mean Cell Hemoglobin Concentration : 32.5 g/dL  Auto Neutrophil # : 8.8 K/uL  Auto Lymphocyte # : 2.2 K/uL  Auto Monocyte # : 0.8 K/uL  Auto Eosinophil # : 0.0 K/uL  Auto Basophil # : x  Auto Neutrophil % : 74.3 %  Auto Lymphocyte % : 18.2 %  Auto Monocyte % : 7.1 %  Auto Eosinophil % : 0.1 %  Auto Basophil % : x        Skin:  multiple skin tears     Nutrition focused physical exam conducted - found signs of malnutrition [ ]absent [ x]present    Subcutaneous fat loss: [ ] Orbital fat pads region, [ ]Buccal fat region, [ ]Triceps region,  [ ]Ribs region    Muscle wasting: [x ]Temples region, [ ]Clavicle region, [x ]Shoulder region, [ ]Scapula region, [ ]Interosseous region,  [ ]thigh region, [ ]Calf region    Estimated Needs:   [x ] no change since previous assessment  [ ] recalculated:     Current Nutrition Diagnosis:  Pt remains at high nutrition risk secondary to inability to consume sufficient energy in setting of dementia, new dx of PNA and admission with dehydration as evidenced by +fluid accumulation in all 4 extremities, physical signs of muscle mass in shoulder and temples. Pt with multiple skin tears to B/L arms with +weeping edema. Diet advanced to Dysphagia 2 mech soft with nectar liquids, pt requires full assistance with meals.      Recommendations:   1. Assist/Encourage with meals.  2. Rx: MVI and Vit. C daily (500mg)   3. Ensure nectar thick TID     Monitoring and Evaluation:   [x ] PO intake [x ] Tolerance to diet prescription [X] Weights  [X] Follow up per protocol [X] Labs:

## 2018-04-02 NOTE — PROGRESS NOTE ADULT - SUBJECTIVE AND OBJECTIVE BOX
CC: AMS/Sepsis    INTERVAL HPI/OVERNIGHT EVENTS: Patient seen and examined, had RRT last night due to rapid afibb. Thia am noted to have increase heartrate. Examined with Japanese translater, asks for mother, no acute distress.     Vital Signs Last 24 Hrs  T(C): 37.2 (02 Apr 2018 03:01), Max: 37.2 (02 Apr 2018 03:01)  T(F): 98.9 (02 Apr 2018 03:01), Max: 98.9 (02 Apr 2018 03:01)  HR: 112 (02 Apr 2018 03:37) (95 - 143)  BP: 118/69 (02 Apr 2018 03:01) (118/69 - 132/62)  BP(mean): --  RR: 18 (02 Apr 2018 03:01) (18 - 18)  SpO2: 94% (02 Apr 2018 03:37) (94% - 99%)    REVIEW OF SYSTEMS:    unable to obtain complete ROS 2/2 confused mental state      PHYSICAL EXAM:    GENERAL: NAD  HEENT: PERRL, +EOMI  NECK: soft, Supple  CHEST/LUNG: base crackles B/L  HEART: S1S2+, Irregular rate and rhythm; No murmurs  ABDOMEN: Soft, Nontender, Nondistended; Bowel sounds present  EXTREMITIES:   No clubbing, cyanosis, or edema. RUL - edematous, weeping,  fragile skin, skin tears+  NEURO: awake, alert, follows commands, Oriented x 1 - self only.    I&O's Detail    01 Apr 2018 07:01  -  02 Apr 2018 07:00  --------------------------------------------------------  IN:    sodium chloride 0.45%: 255 mL    Solution: 50 mL    Solution: 100 mL  Total IN: 405 mL    OUT:  Total OUT: 0 mL    Total NET: 405 mL                                    10.6   11.8  )-----------( 195      ( 02 Apr 2018 04:41 )             32.6     02 Apr 2018 04:41    146    |  112    |  16.0   ----------------------------<  88     3.2     |  19.0   |  0.52     Ca    7.6        02 Apr 2018 04:41  Phos  2.0       02 Apr 2018 04:41  Mg     1.9       02 Apr 2018 04:41    TPro  5.1    /  Alb  2.6    /  TBili  0.5    /  DBili  x      /  AST  29     /  ALT  25     /  AlkPhos  47     02 Apr 2018 04:41      CAPILLARY BLOOD GLUCOSE      POCT Blood Glucose.: 80 mg/dL (02 Apr 2018 14:34)  POCT Blood Glucose.: 69 mg/dL (02 Apr 2018 13:54)  POCT Blood Glucose.: 85 mg/dL (02 Apr 2018 09:24)  POCT Blood Glucose.: 84 mg/dL (02 Apr 2018 04:14)  POCT Blood Glucose.: 95 mg/dL (01 Apr 2018 22:25)  POCT Blood Glucose.: 102 mg/dL (01 Apr 2018 17:15)    LIVER FUNCTIONS - ( 02 Apr 2018 04:41 )  Alb: 2.6 g/dL / Pro: 5.1 g/dL / ALK PHOS: 47 U/L / ALT: 25 U/L / AST: 29 U/L / GGT: x             Hemoglobin A1C, Whole Blood: 5.4 % (03-30-18 @ 05:32)    MEDICATIONS  (STANDING):  ascorbic acid IVPB 1500 milliGRAM(s) IV Intermittent every 6 hours  aspirin  chewable 81 milliGRAM(s) Oral daily  BACItracin   Ointment 1 Application(s) Topical every 12 hours  dextrose 5%. 1000 milliLiter(s) (50 mL/Hr) IV Continuous <Continuous>  dextrose 50% Injectable 12.5 Gram(s) IV Push once  dextrose 50% Injectable 25 Gram(s) IV Push once  dextrose 50% Injectable 25 Gram(s) IV Push once  diltiazem    Tablet 30 milliGRAM(s) Oral every 6 hours  heparin  Injectable 5000 Unit(s) SubCutaneous every 12 hours  insulin lispro (HumaLOG) corrective regimen sliding scale   SubCutaneous every 6 hours  levothyroxine 12.5 MICROGram(s) Oral daily  piperacillin/tazobactam IVPB. 3.375 Gram(s) IV Intermittent every 12 hours  potassium chloride  10 mEq/100 mL IVPB 10 milliEquivalent(s) IV Intermittent every 1 hour  QUEtiapine 25 milliGRAM(s) Oral at bedtime    MEDICATIONS  (PRN):  ALBUTerol/ipratropium for Nebulization 3 milliLiter(s) Nebulizer every 6 hours PRN Shortness of Breath  dextrose Gel 1 Dose(s) Oral once PRN Blood Glucose LESS THAN 70 milliGRAM(s)/deciLiter  dextrose Gel 1 Dose(s) Oral once PRN Blood Glucose LESS THAN 70 milliGRAM(s)/deciLiter  glucagon  Injectable 1 milliGRAM(s) IntraMuscular once PRN Glucose <70 milliGRAM(s)/deciLiter  levalbuterol Inhalation 0.63 milliGRAM(s) Inhalation every 6 hours PRN SOB      RADIOLOGY & ADDITIONAL TESTS:

## 2018-04-03 LAB
ANION GAP SERPL CALC-SCNC: 15 MMOL/L — SIGNIFICANT CHANGE UP (ref 5–17)
BUN SERPL-MCNC: 11 MG/DL — SIGNIFICANT CHANGE UP (ref 8–20)
CALCIUM SERPL-MCNC: 7.9 MG/DL — LOW (ref 8.6–10.2)
CHLORIDE SERPL-SCNC: 115 MMOL/L — HIGH (ref 98–107)
CO2 SERPL-SCNC: 21 MMOL/L — LOW (ref 22–29)
CREAT SERPL-MCNC: 0.47 MG/DL — LOW (ref 0.5–1.3)
GLUCOSE BLDC GLUCOMTR-MCNC: 102 MG/DL — HIGH (ref 70–99)
GLUCOSE BLDC GLUCOMTR-MCNC: 83 MG/DL — SIGNIFICANT CHANGE UP (ref 70–99)
GLUCOSE BLDC GLUCOMTR-MCNC: 97 MG/DL — SIGNIFICANT CHANGE UP (ref 70–99)
GLUCOSE SERPL-MCNC: 101 MG/DL — SIGNIFICANT CHANGE UP (ref 70–115)
HCT VFR BLD CALC: 32.2 % — LOW (ref 37–47)
HGB BLD-MCNC: 10.4 G/DL — LOW (ref 12–16)
MAGNESIUM SERPL-MCNC: 1.8 MG/DL — SIGNIFICANT CHANGE UP (ref 1.6–2.6)
MCHC RBC-ENTMCNC: 31.5 PG — HIGH (ref 27–31)
MCHC RBC-ENTMCNC: 32.3 G/DL — SIGNIFICANT CHANGE UP (ref 32–36)
MCV RBC AUTO: 97.6 FL — SIGNIFICANT CHANGE UP (ref 81–99)
PHOSPHATE SERPL-MCNC: 2.9 MG/DL — SIGNIFICANT CHANGE UP (ref 2.4–4.7)
PLATELET # BLD AUTO: 230 K/UL — SIGNIFICANT CHANGE UP (ref 150–400)
POTASSIUM SERPL-MCNC: 3.5 MMOL/L — SIGNIFICANT CHANGE UP (ref 3.5–5.3)
POTASSIUM SERPL-SCNC: 3.5 MMOL/L — SIGNIFICANT CHANGE UP (ref 3.5–5.3)
RBC # BLD: 3.3 M/UL — LOW (ref 4.4–5.2)
RBC # FLD: 14.2 % — SIGNIFICANT CHANGE UP (ref 11–15.6)
SODIUM SERPL-SCNC: 151 MMOL/L — HIGH (ref 135–145)
WBC # BLD: 11.2 K/UL — HIGH (ref 4.8–10.8)
WBC # FLD AUTO: 11.2 K/UL — HIGH (ref 4.8–10.8)

## 2018-04-03 PROCEDURE — 99232 SBSQ HOSP IP/OBS MODERATE 35: CPT

## 2018-04-03 PROCEDURE — 99233 SBSQ HOSP IP/OBS HIGH 50: CPT

## 2018-04-03 RX ORDER — MORPHINE SULFATE 50 MG/1
1 CAPSULE, EXTENDED RELEASE ORAL ONCE
Qty: 0 | Refills: 0 | Status: DISCONTINUED | OUTPATIENT
Start: 2018-04-03 | End: 2018-04-03

## 2018-04-03 RX ORDER — FUROSEMIDE 40 MG
40 TABLET ORAL ONCE
Qty: 0 | Refills: 0 | Status: COMPLETED | OUTPATIENT
Start: 2018-04-03 | End: 2018-04-03

## 2018-04-03 RX ORDER — MORPHINE SULFATE 50 MG/1
1 CAPSULE, EXTENDED RELEASE ORAL EVERY 4 HOURS
Qty: 0 | Refills: 0 | Status: DISCONTINUED | OUTPATIENT
Start: 2018-04-03 | End: 2018-04-04

## 2018-04-03 RX ADMIN — Medication 1 APPLICATION(S): at 17:00

## 2018-04-03 RX ADMIN — Medication 1 APPLICATION(S): at 05:51

## 2018-04-03 RX ADMIN — Medication 103 MILLIGRAM(S): at 02:38

## 2018-04-03 RX ADMIN — PIPERACILLIN AND TAZOBACTAM 25 GRAM(S): 4; .5 INJECTION, POWDER, LYOPHILIZED, FOR SOLUTION INTRAVENOUS at 17:05

## 2018-04-03 RX ADMIN — MORPHINE SULFATE 1 MILLIGRAM(S): 50 CAPSULE, EXTENDED RELEASE ORAL at 23:57

## 2018-04-03 RX ADMIN — MORPHINE SULFATE 1 MILLIGRAM(S): 50 CAPSULE, EXTENDED RELEASE ORAL at 17:12

## 2018-04-03 RX ADMIN — QUETIAPINE FUMARATE 25 MILLIGRAM(S): 200 TABLET, FILM COATED ORAL at 21:30

## 2018-04-03 RX ADMIN — HEPARIN SODIUM 5000 UNIT(S): 5000 INJECTION INTRAVENOUS; SUBCUTANEOUS at 06:21

## 2018-04-03 RX ADMIN — PIPERACILLIN AND TAZOBACTAM 25 GRAM(S): 4; .5 INJECTION, POWDER, LYOPHILIZED, FOR SOLUTION INTRAVENOUS at 06:21

## 2018-04-03 RX ADMIN — Medication 40 MILLIGRAM(S): at 12:32

## 2018-04-03 RX ADMIN — HALOPERIDOL DECANOATE 1 MILLIGRAM(S): 100 INJECTION INTRAMUSCULAR at 00:14

## 2018-04-03 NOTE — PROGRESS NOTE ADULT - SUBJECTIVE AND OBJECTIVE BOX
CC: AMS    HPI:  This is a 89 y/o F PMHx dementia, DM, HTN and hypothyroidism, BIBA to ED for altered mental status for the past three days. EMS states pt lives with family and has been declining for the past three days after a UTI diagnosed by a visiting at home nurse. Pt was placed on Macrobid for UTI. At baseline pt is able to ambulate with a walker and is verbal. EMS found pt hypotensive, tachycardic and warm to the touch when they arrived. History verified from her son and NOK Mr. Hitesh Perera over phone who has been her caretaker for years. As per son, patient ambulates with walker at baseline and sometimes get agitated.  Mr Hitesh Perera is currently not well and states that Rao Perera (Patient's grandson) is to be making decisions on patient's behalf.      INTERVAL HPI/OVERNIGHT EVENTS: Patient seen and examined this am.  Patient appeared uncomfortable, restless and tachypneic.     Vital Signs Last 24 Hrs  T(C): 37.1 (03 Apr 2018 08:50), Max: 37.1 (03 Apr 2018 08:50)  T(F): 98.7 (03 Apr 2018 08:50), Max: 98.7 (03 Apr 2018 08:50)  HR: 90 (03 Apr 2018 09:30) (61 - 107)  BP: 155/80 (03 Apr 2018 09:30) (145/85 - 172/93)  BP(mean): --  RR: 20 (03 Apr 2018 08:50) (20 - 20)  SpO2: 92% (03 Apr 2018 08:50) (92% - 92%)  I&O's Detail    PHYSICAL EXAM:  GENERAL: mild respiratory distress  HEAD:  Atraumatic, Normocephalic  NECK: Supple, No JVD, Normal thyroid  NERVOUS SYSTEM:  Alert, generalized weakness  CHEST/LUNG: Decreased air entry bilaterally  HEART: Regular rate and rhythm; No murmurs, rubs, or gallops  ABDOMEN: Soft, Nontender, Nondistended; Bowel sounds present  EXTREMITIES:  2+ Peripheral Pulses, No clubbing, cyanosis, or edema  SKIN: Multiple skin tears on bilateral UE and LE                              10.4   11.2  )-----------( 230      ( 03 Apr 2018 07:53 )             32.2     03 Apr 2018 07:53    151    |  115    |  11.0   ----------------------------<  101    3.5     |  21.0   |  0.47     Ca    7.9        03 Apr 2018 07:53  Phos  2.9       03 Apr 2018 07:53  Mg     1.8       03 Apr 2018 07:53    TPro  5.1    /  Alb  2.6    /  TBili  0.5    /  DBili  x      /  AST  29     /  ALT  25     /  AlkPhos  47     02 Apr 2018 04:41    Culture - Blood (03.31.18 @ 00:12)    Specimen Source: .Blood Blood    Culture Results:   No growth at 48 hours    Culture - Blood (03.31.18 @ 00:12)    Specimen Source: .Blood Blood    Culture Results:   No growth at 48 hours    Culture - Sputum . (03.30.18 @ 07:15)    Gram Stain:   Few White blood cells  Few Yeast  Few Gram positive cocci in pairs  Few Gram Positive Rods    Specimen Source: .Sputum Sputum    Culture Results:   Numerous Candida albicans  Numerous Routine respiratory venkat present    Culture - Urine (03.30.18 @ 05:47)    Specimen Source: .Urine Catheterized    Culture Results:   No growth        CAPILLARY BLOOD GLUCOSE  POCT Blood Glucose.: 97 mg/dL (03 Apr 2018 12:23)  POCT Blood Glucose.: 102 mg/dL (03 Apr 2018 07:37)  POCT Blood Glucose.: 99 mg/dL (02 Apr 2018 23:36)  POCT Blood Glucose.: 80 mg/dL (02 Apr 2018 22:05)  POCT Blood Glucose.: 83 mg/dL (02 Apr 2018 21:08)  POCT Blood Glucose.: 74 mg/dL (02 Apr 2018 18:54)    LIVER FUNCTIONS - ( 02 Apr 2018 04:41 )  Alb: 2.6 g/dL / Pro: 5.1 g/dL / ALK PHOS: 47 U/L / ALT: 25 U/L / AST: 29 U/L / GGT: x             Hemoglobin A1C, Whole Blood: 5.4 % (03-30-18 @ 05:32)    MEDICATIONS  (STANDING):  aspirin  chewable 81 milliGRAM(s) Oral daily  BACItracin   Ointment 1 Application(s) Topical every 12 hours  dextrose 5%. 1000 milliLiter(s) (50 mL/Hr) IV Continuous <Continuous>  dextrose 50% Injectable 12.5 Gram(s) IV Push once  dextrose 50% Injectable 25 Gram(s) IV Push once  dextrose 50% Injectable 25 Gram(s) IV Push once  diltiazem    Tablet 30 milliGRAM(s) Oral every 6 hours  heparin  Injectable 5000 Unit(s) SubCutaneous every 12 hours  insulin lispro (HumaLOG) corrective regimen sliding scale   SubCutaneous every 6 hours  levothyroxine 12.5 MICROGram(s) Oral daily  piperacillin/tazobactam IVPB. 3.375 Gram(s) IV Intermittent every 12 hours  QUEtiapine 25 milliGRAM(s) Oral at bedtime  saccharomyces boulardii 250 milliGRAM(s) Oral two times a day    MEDICATIONS  (PRN):  ALBUTerol/ipratropium for Nebulization 3 milliLiter(s) Nebulizer every 6 hours PRN Shortness of Breath  dextrose Gel 1 Dose(s) Oral once PRN Blood Glucose LESS THAN 70 milliGRAM(s)/deciLiter  glucagon  Injectable 1 milliGRAM(s) IntraMuscular once PRN Glucose <70 milliGRAM(s)/deciLiter  levalbuterol Inhalation 0.63 milliGRAM(s) Inhalation every 6 hours PRN SOB      RADIOLOGY & ADDITIONAL TESTS:  < from: Xray Chest 1 View AP/PA. (04.02.18 @ 10:30) >   EXAM:  XR CHEST AP OR PA 1V                          PROCEDURE DATE:  04/02/2018          INTERPRETATION:  EXAM: PORTABLE CHEST.     CLINICAL INDICATION: Septic shock, shortness of breath.     TECHNIQUE: AP view.     PRIOR EXAM: 03/30/2018.     FINDINGS:      Endotracheal and nasogastric tubes as well as the central line have been   discontinued. There is suggestion of small bilateral pleural effusions.   Definite exclusion of bibasilar infiltrates cannot be made. Magnified   cardiac silhouette is stable. Mitral annulus calcification is again   visualized. Degenerative arthritis is seen involving the shoulder joints.      IMPRESSION:     Discontinuation of support devices. Mild bibasilar opacities as described   above.                  DIVINA SHELBY M.D., ATTENDING RADIOLOGIST  This document has been electronically signed. Apr 2 2018 12:24PM                < end of copied text >

## 2018-04-03 NOTE — PROGRESS NOTE ADULT - ASSESSMENT
THIS 89 Y.O. F HERE FOR DELIRIUM WITH BASELINE DEMENTIA, RECENT UTI, NOW FOUND WITH:    FEVERS, SEPSIS   HYPERNATREMIA  ENCEPHALOPATHY   ON EMPIRIC TREATMENT FOR PNEUMONIA    - CONTINUE ZOSYN Q12H;  DAY NUMBER 6  WOULD COMPLETE 7 DAYS OF TREATMENT  IF DECISION IS COMFORT  THAN CAN D/C ABX THIS 89 Y.O. F HERE FOR DELIRIUM WITH BASELINE DEMENTIA, RECENT UTI, NOW FOUND WITH:    FEVERS, SEPSIS   HYPERNATREMIA  ENCEPHALOPATHY   ON EMPIRIC TREATMENT FOR PNEUMONIA    - CONTINUE ZOSYN Q12H;  DAY NUMBER 6  WOULD COMPLETE 7 DAYS OF TREATMENT  IF DECISION IS COMFORT  THAN CAN D/C ABX   WILL FOLLOW UP AS NEEDED

## 2018-04-03 NOTE — PROGRESS NOTE ADULT - ASSESSMENT
89 y/o female pmhx of HTN, DM, Dementia, hypothyroidism admitted on 3/29 w/ septic shock 2/2 UTI and pneumonia  requiring short period of pressors, as well as acute hypoxic respiratory failure requiring intubation. Weaned off pressors and started on midodrine however she  had episodes of bradycardia on midodrine and it was discontinued. She was extubated on 3/31 and has done well on NC. She also had GRAY< hypernatremia which has improved with iv fluid resuscitation. transferred out of MICU 3/31. Blood cultures 3/29 - 1/2 - grew coag Neg staph - possibly contaminant. Repeat blood cultures negative.  Had rapid response on 4/1 - for rapid afib, received iv lopressor, cardio consulted.        Problem/Plan - 1:  ·  Problem: Septic shock.  Plan: Resolved, HD stable off pressors.   One bottle of blood cultures growing GPC, likely containment. Repeat cultures negative.  c/w zosyn for PNA/ UTI - Day 6/7  on Vit C per ICU team - [facility] protocol.      Problem/Plan - 2:  ·  Problem: Encephalopathy acute. Plan: delirium on Dementia  Possibly 2/2 toxic metabolic encephalopathy - as per Grandson was ambulating with walker one week PTA  Ct supportive care, treat infection, taper off steroids. Discussion with Jeanine Yeh (HCP), Palliative care consult, DNR obtained. Per jeanine Yeh, granddaughter (Cony) is not making decisions for patient.     Problem/Plan - 3:  ·  Problem: GRAY (acute kidney injury). Plan: resolved with IVF hydration, monitor BMP.        Problem/Plan - 4:  ·  Problem: UTI (urinary tract infection). Plan: c/w zosyn day 6/7.     Problem/Plan - 5:  ·  Problem: Lobar pneumonia. Plan: c/w zosyn day 6/7  Chest PT  Incentive spirometry  Duonebs q6hr.     Problem/Plan - 6:  Problem: New onset AFIBB. Plan:  Cardio consult, increase Cardizem to 30 mg QID, patient is not a candidate for AC.      Problem/Plan - 7:  ·  Problem: Dementia with behavioral disturbance, unspecified dementia type. Plan: c/w Seroquel. Palliative Consult, patient DNR     Problem/Plan - 8:  ·  Problem: Pleural Effusion.  Plan: D/C IV fluids; Lasix 40mg IV x1 today.    Problem/Plan-9:  Problem: Dysphagia. Plan: Nectar thickened liquids. Aspiration precautions 89 y/o female pmhx of HTN, DM, Dementia, hypothyroidism admitted on 3/29 w/ septic shock 2/2 UTI and pneumonia  requiring short period of pressors, as well as acute hypoxic respiratory failure requiring intubation. Weaned off pressors and started on midodrine however she  had episodes of bradycardia on midodrine and it was discontinued. She was extubated on 3/31 and has done well on NC. She also had GRAY< hypernatremia which has improved with iv fluid resuscitation. transferred out of MICU 3/31. Blood cultures 3/29 - 1/2 - grew coag Neg staph - possibly contaminant. Repeat blood cultures negative.  Had rapid response on 4/1 - for rapid afib, received iv lopressor, cardio consulted.        Problem/Plan - 1:  ·  Problem: Septic shock.  Plan: Resolved, HD stable off pressors.   One bottle of blood cultures growing GPC, likely containment. Repeat cultures negative.  c/w zosyn for PNA/ UTI - Day 6/7       Problem/Plan - 2:  ·  Problem: Encephalopathy acute. Plan: delirium on Dementia  Possibly 2/2 toxic metabolic encephalopathy - as per Chantel was ambulating with walker one week PTA  Ct supportive care, treat infection, taper off steroids. Discussion with Chantel Yeh (HCP), Palliative care consult, DNR obtained. Per sarahi Yeh, granddaughter (Cony) is not making decisions for patient.     Problem/Plan - 3:  ·  Problem: GRAY (acute kidney injury). Plan: resolved with IVF hydration, monitor BMP.        Problem/Plan - 4:  ·  Problem: UTI (urinary tract infection). Plan: c/w zosyn day 6/7.     Problem/Plan - 5:  ·  Problem: Lobar pneumonia. likely 2/2 aspiration event - gram negative bacteria Plan: c/w zosyn day 6/7  Chest PT  Incentive spirometry  Duonebs q6hr.     Problem/Plan - 6:  Problem: New onset AFIBB. Plan:  Cardio consult, increase Cardizem to 30 mg QID, patient is not a candidate for AC.      Problem/Plan - 7:  ·  Problem: Dementia with behavioral disturbance, unspecified dementia type. Plan: c/w Seroquel. Palliative Consult, patient DNR     Problem/Plan - 8:  ·  Problem: Pleural Effusion.  Plan: D/C IV fluids; Lasix 40mg IV x1 today.    Problem/Plan-9:  Problem: Dysphagia. Plan: Nectar thickened liquids. Aspiration precautions

## 2018-04-03 NOTE — PROGRESS NOTE ADULT - SUBJECTIVE AND OBJECTIVE BOX
[facility]  INFECTIOUS DISEASES AND INTERNAL MEDICINE at [place]  =======================================================  Joon Solorio MD  Diplomates American Board of Internal Medicine and Infectious Diseases  =======================================================    GLENN PHILLIPERRATE [id number]  chief complaint:  follow up on UTI, possible PNA  patient seen and examined in follow up.         =======================================================  Allergies:  No Known Allergies      =======================================================  Medications:  ALBUTerol    0.083% 2.5 milliGRAM(s) Nebulizer every 6 hours PRN        =======================================================     REVIEW OF SYSTEMS:  Unable to obtain.     =======================================================    Physical Exam:    Vital Signs Last 24 Hrs  T(C): 37.1 (2018 08:50), Max: 37.1 (2018 08:50)  T(F): 98.7 (2018 08:50), Max: 98.7 (2018 08:50)  HR: 90 (2018 09:30) (61 - 107)  BP: 155/80 (2018 09:30) (145/85 - 172/93)  BP(mean): --  RR: 20 (2018 08:50) (20 - 20)  SpO2: 92% (2018 08:50) (92% - 92%)      General: THIN FRAIL LETHARGIC; intubated  Eye:   HENT: Normocephalic, ET tube in place  Neck: Supple,  left side TLC catheter.   Respiratory: COARSE UPPER AIRWAY RHONCHI, POOR BASE AERATION  Cardiovascular: Normal rate, TACHYCARDIA; NO EDEMA  Gastrointestinal: Soft, Non-distended, Normal bowel sounds.  Genitourinary: NO PIEDRA IN PLACE  Musculoskeletal: Withdraws EXTREMITIES to noxious stimuli  Integumentary: No rash.  Neurologic: Cranial Nerves II-XII are grossly intact. NON VERBAL CURRENTLY  Psychiatric: UNABLE TO ASSESS        =======================================================    Labs:                         10.4   11.2  )-----------( 230      ( 2018 07:53 )             32.2       04-03    151<H>  |  115<H>  |  11.0  ----------------------------<  101  3.5   |  21.0<L>  |  0.47<L>    Ca    7.9<L>      2018 07:53  Phos  2.9     04-03  Mg     1.8     04-03    TPro  5.1<L>  /  Alb  2.6<L>  /  TBili  0.5  /  DBili  x   /  AST  29  /  ALT  25  /  AlkPhos  47  04-02         Color: Yellow / Appearance: Slightly Turbid / S.020 / pH: x  Gluc: x / Ketone: Trace  / Bili: Negative / Urobili: Negative mg/dL   Blood: x / Protein: 30 mg/dL / Nitrite: Negative   Leuk Esterase: Small / RBC: 0-2 /HPF / WBC 3-5   Sq Epi: x / Non Sq Epi: Occasional / Bacteria: Occasional      LIVER FUNCTIONS - ( 29 Mar 2018 13:47 )  Alb: 3.1 g/dL / Pro: 6.4 g/dL / ALK PHOS: 56 U/L / ALT: 40 U/L / AST: 69 U/L / GGT: x               CAPILLARY BLOOD GLUCOSE      POCT Blood Glucose.: 220 mg/dL (30 Mar 2018 12:22)  POCT Blood Glucose.: 124 mg/dL (30 Mar 2018 07:12)  POCT Blood Glucose.: 169 mg/dL (30 Mar 2018 01:02)        RECENT CULTURES:   @ 07:15 .Sputum Sputum       Few White blood cells  Few Yeast  Few Gram positive cocci in pairs  Few Gram Positive Rods       @ 10:43 .Blood Blood-Peripheral Blood Culture PCR    Growth in aerobic and anaerobic bottles: Gram Positive Cocci in Clusters  Aerobic Bottle: 20:08 Hours to positivity  Anaerobic Bottle: 21:08 Hours to positivity  ***Blood Panel PCR results on this specimen are available  approximately 3 hours afterthe Gram stain result.***  Gram stain, PCR, and/or culture results may not always  correspond due to difference in methodologies.  ************************************************************  This PCR assay was performed using [employer].  The following targets are tested for: Enterococcus,  vancomycin resistant enterococci, Listeria monocytogenes,  coagulase negative staphylococci, S. aureus,  methicillin resistant S. aureus, Streptococcus agalactiae  (Group B), S. pneumoniae, S. pyogenes (Group A),  Acinetobacter baumannii, Enterobacter cloacae, E. coli,  Klebsiella oxytoca, K. pneumoniae, Proteus sp.,  Serratia marcescens, Haemophilus influenzae,  Neisseria meningitidis, Pseudomonas aeruginosa, Candida  albicans, C. glabrata, C krusei, C parapsilosis,  C. tropicalis and the KPC resistance gene.  "Due to technical problems, Proteus sp. will Not be reported as part of  the BCID panel until further notice"  ,  TYPE: (C=Critical, N=Notification, A=Abnormal) c  TESTS:  _   DATE/TIME CALLED: _ 2018 08:52:17  CALLED TO: _ dre worst rn  READ BACK (2 Patient Identifiers)(Y/N): _ y  READ BACK VALUES (Y/N): _ y  CALLED BY: Charly otto

## 2018-04-04 DIAGNOSIS — R06.02 SHORTNESS OF BREATH: ICD-10-CM

## 2018-04-04 DIAGNOSIS — Z51.5 ENCOUNTER FOR PALLIATIVE CARE: ICD-10-CM

## 2018-04-04 DIAGNOSIS — G30.9 ALZHEIMER'S DISEASE, UNSPECIFIED: ICD-10-CM

## 2018-04-04 DIAGNOSIS — R52 PAIN, UNSPECIFIED: ICD-10-CM

## 2018-04-04 LAB
ANION GAP SERPL CALC-SCNC: 13 MMOL/L — SIGNIFICANT CHANGE UP (ref 5–17)
BUN SERPL-MCNC: 8 MG/DL — SIGNIFICANT CHANGE UP (ref 8–20)
CALCIUM SERPL-MCNC: 8.1 MG/DL — LOW (ref 8.6–10.2)
CHLORIDE SERPL-SCNC: 112 MMOL/L — HIGH (ref 98–107)
CO2 SERPL-SCNC: 26 MMOL/L — SIGNIFICANT CHANGE UP (ref 22–29)
CREAT SERPL-MCNC: 0.47 MG/DL — LOW (ref 0.5–1.3)
CULTURE RESULTS: SIGNIFICANT CHANGE UP
GLUCOSE BLDC GLUCOMTR-MCNC: 76 MG/DL — SIGNIFICANT CHANGE UP (ref 70–99)
GLUCOSE BLDC GLUCOMTR-MCNC: 85 MG/DL — SIGNIFICANT CHANGE UP (ref 70–99)
GLUCOSE BLDC GLUCOMTR-MCNC: 86 MG/DL — SIGNIFICANT CHANGE UP (ref 70–99)
GLUCOSE SERPL-MCNC: 77 MG/DL — SIGNIFICANT CHANGE UP (ref 70–115)
POTASSIUM SERPL-MCNC: 3.1 MMOL/L — LOW (ref 3.5–5.3)
POTASSIUM SERPL-SCNC: 3.1 MMOL/L — LOW (ref 3.5–5.3)
SODIUM SERPL-SCNC: 151 MMOL/L — HIGH (ref 135–145)
SPECIMEN SOURCE: SIGNIFICANT CHANGE UP

## 2018-04-04 PROCEDURE — 99232 SBSQ HOSP IP/OBS MODERATE 35: CPT

## 2018-04-04 PROCEDURE — 99223 1ST HOSP IP/OBS HIGH 75: CPT

## 2018-04-04 PROCEDURE — 99233 SBSQ HOSP IP/OBS HIGH 50: CPT

## 2018-04-04 RX ORDER — MORPHINE SULFATE 50 MG/1
2 CAPSULE, EXTENDED RELEASE ORAL
Qty: 0 | Refills: 0 | Status: DISCONTINUED | OUTPATIENT
Start: 2018-04-04 | End: 2018-04-06

## 2018-04-04 RX ORDER — MORPHINE SULFATE 50 MG/1
2 CAPSULE, EXTENDED RELEASE ORAL EVERY 6 HOURS
Qty: 0 | Refills: 0 | Status: DISCONTINUED | OUTPATIENT
Start: 2018-04-04 | End: 2018-04-06

## 2018-04-04 RX ORDER — ACETAMINOPHEN 500 MG
650 TABLET ORAL EVERY 6 HOURS
Qty: 0 | Refills: 0 | Status: DISCONTINUED | OUTPATIENT
Start: 2018-04-04 | End: 2018-04-06

## 2018-04-04 RX ORDER — POTASSIUM CHLORIDE 20 MEQ
40 PACKET (EA) ORAL EVERY 4 HOURS
Qty: 0 | Refills: 0 | Status: COMPLETED | OUTPATIENT
Start: 2018-04-04 | End: 2018-04-04

## 2018-04-04 RX ORDER — SODIUM CHLORIDE 9 MG/ML
1000 INJECTION, SOLUTION INTRAVENOUS
Qty: 0 | Refills: 0 | Status: DISCONTINUED | OUTPATIENT
Start: 2018-04-04 | End: 2018-04-04

## 2018-04-04 RX ADMIN — PIPERACILLIN AND TAZOBACTAM 25 GRAM(S): 4; .5 INJECTION, POWDER, LYOPHILIZED, FOR SOLUTION INTRAVENOUS at 05:22

## 2018-04-04 RX ADMIN — MORPHINE SULFATE 2 MILLIGRAM(S): 50 CAPSULE, EXTENDED RELEASE ORAL at 21:06

## 2018-04-04 RX ADMIN — MORPHINE SULFATE 2 MILLIGRAM(S): 50 CAPSULE, EXTENDED RELEASE ORAL at 18:54

## 2018-04-04 RX ADMIN — MORPHINE SULFATE 2 MILLIGRAM(S): 50 CAPSULE, EXTENDED RELEASE ORAL at 23:45

## 2018-04-04 RX ADMIN — Medication 1 APPLICATION(S): at 18:19

## 2018-04-04 RX ADMIN — MORPHINE SULFATE 1 MILLIGRAM(S): 50 CAPSULE, EXTENDED RELEASE ORAL at 10:35

## 2018-04-04 RX ADMIN — SODIUM CHLORIDE 100 MILLILITER(S): 9 INJECTION, SOLUTION INTRAVENOUS at 18:37

## 2018-04-04 RX ADMIN — Medication 1 APPLICATION(S): at 05:23

## 2018-04-04 RX ADMIN — MORPHINE SULFATE 1 MILLIGRAM(S): 50 CAPSULE, EXTENDED RELEASE ORAL at 00:15

## 2018-04-04 RX ADMIN — SODIUM CHLORIDE 75 MILLILITER(S): 9 INJECTION, SOLUTION INTRAVENOUS at 12:57

## 2018-04-04 RX ADMIN — MORPHINE SULFATE 2 MILLIGRAM(S): 50 CAPSULE, EXTENDED RELEASE ORAL at 21:30

## 2018-04-04 NOTE — PROGRESS NOTE ADULT - PROBLEM SELECTOR PLAN 1
ct cardizem 30 Q6.   not candidate for long term anticoagulation .  agree with hospice and palliative,.

## 2018-04-04 NOTE — CONSULT NOTE ADULT - ATTENDING COMMENTS
Thank you for allowing me to participate in care of your patient.   Please call as needed.
Thank you for the opportunity to assist with the care of this patient.   [facility] [phone].

## 2018-04-04 NOTE — PHYSICAL THERAPY INITIAL EVALUATION ADULT - ADDITIONAL COMMENTS
pt unable to provide history, most from P and previously written notes. Pt ambulates independently with an AD, lives with her family.

## 2018-04-04 NOTE — CHART NOTE - NSCHARTNOTEFT_GEN_A_CORE
89 year old female: Review of chart reveals that pt is DNR/DNI on comfort measures.  Daughter at bedside.  Pt found by RN to have i.v. infiltration of right AC fossa i.v., with severe skin tear on dorsum of left hand, as well as, multiple other superficial skin tears.  Pt is Malaysian speaking and, per daughter, confused with only short term understanding of care provided to her.  Comfort measure at this time will remain focused on relieving patients pain and airway secretions.      REVIEW OF SYSTEMS:    CONSTITUTIONAL: No fever, weight loss, or fatigue  EYES: No eye pain, visual disturbances, or discharge  EENT:  No difficulty hearing, tinnitus, vertigo; No sinus or throat pain  NECK: No pain or stiffness  BREASTS: No pain, masses, or nipple discharge  RESPIRATORY: No cough, wheezing, chills or hemoptysis; No shortness of breath; episodic cough with noted secretions per family  CARDIOVASCULAR: No chest pain, palpitations, dizziness, or leg swelling  GASTROINTESTINAL: No abdominal or epigastric pain. No nausea, vomiting, or hematemesis; No diarrhea or constipation. No melena or hematochezia.  GENITOURINARY: No dysuria, frequency, hematuria, or incontinence  NEUROLOGICAL: No headaches, memory loss, loss of strength, numbness, or tremors  SKIN: No itching, burning, rashes, or lesions ++mutliple skin tears  LYMPH NODES: No enlarged glands  ENDOCRINE: No heat or cold intolerance; No hair loss  MUSCULOSKELETAL: No joint pain or swelling; No muscle, back, or extremity pain  PSYCHIATRIC: No depression, anxiety, mood swings, or difficulty sleeping  HEME/LYMPH: No easy bruising, or bleeding gums  ALLERGY AND IMMUNOLOGIC: No hives or eczema  PAST MEDICAL & SURGICAL HISTORY:  Dementia with behavioral disturbance, unspecified dementia type  Depression  Hypothyroidism  DJD (degenerative joint disease)  Hypertension  H/O: hysterectomy  Patient is a 89y old  Female who presents with a chief complaint of AMS, (30 Mar 2018 01:47)  acetaminophen   Tablet 650 milliGRAM(s) Oral every 6 hours PRN  ALBUTerol/ipratropium for Nebulization 3 milliLiter(s) Nebulizer every 6 hours PRN  aspirin  chewable 81 milliGRAM(s) Oral daily  BACItracin   Ointment 1 Application(s) Topical every 12 hours  levalbuterol Inhalation 0.63 milliGRAM(s) Inhalation every 6 hours PRN  levothyroxine 12.5 MICROGram(s) Oral daily  morphine  - Injectable 2 milliGRAM(s) IV Push every 2 hours PRN  morphine  - Injectable 2 milliGRAM(s) IV Push every 6 hours  morphine Concentrate 2 milliGRAM(s) Oral every 3 hours PRN  QUEtiapine 25 milliGRAM(s) Oral at bedtime  saccharomyces boulardii 250 milliGRAM(s) Oral two times a day    Labs 4/2/18 protein 5.1; albumin 2.6    On examination:   Pt is awake, making eye contact, talking in Malaysian to daughter in NAD  Bilateral arms show significant STS at Left hand to forearm, weeping at skin tears; Right AC fossa with significant STS distal and proximal to i.v. noted; i.v. d/c'd by RN aseptically; skin is weeping with fluctuant areas involving STS  Neurovascularly intact distal and proximal to all i.v. infiltrates    Impression:  1- bilateral infiltration of i.v.'s with skin tears and weeping skin.                     2- malnourished/protein deficiency                       Plan:  1- d/c i.v. fluids           2- sublingual morphine ordered           3- d/w family pt condition and plan of care; all questions answered and emotional support given.           4- d/w Dr. ROBBIE Bustamante and in agreement.           5- continue observation and re-evaluation prn

## 2018-04-04 NOTE — CONSULT NOTE ADULT - PROBLEM SELECTOR RECOMMENDATION 3
Likely pre-renal in nature in setting of severe dehydration. Continue IVF hydration. Trend BUN/Cr. Monitor lytes. Monitor urine output. Avoid nephrotoxic agents.
-morphine ATC and PRN

## 2018-04-04 NOTE — PROGRESS NOTE ADULT - ASSESSMENT
89 y/o female pmhx of HTN, DM, Dementia, hypothyroidism admitted on 3/29 w/ septic shock 2/2 UTI and pneumonia  requiring short period of pressors, as well as acute hypoxic respiratory failure requiring intubation. Weaned off pressors and started on midodrine however she  had episodes of bradycardia on midodrine and it was discontinued. She was extubated on 3/31 and has done well on NC. She also had GRAY< hypernatremia which has improved with iv fluid resuscitation. transferred out of MICU 3/31. Blood cultures 3/29 - 1/2 - grew coag Neg staph - possibly contaminant. Repeat blood cultures negative.  Had rapid response on 4/1 - for rapid afib, received iv lopressor, cardio consulted.        Problem/Plan - 1:  ·  Problem: Septic shock.  Plan: Resolved, HD stable off pressors.   One bottle of blood cultures growing GPC, likely containment. Repeat cultures negative.  c/w zosyn for PNA/ UTI - to be completed today.       Problem/Plan - 2:  ·  Problem: Encephalopathy acute. Plan: delirium on Dementia  Possibly 2/2 toxic metabolic encephalopathy - as per Grandson was ambulating with walker one week PTA  Ct supportive care, treat infection, tapered off steroids. Discussion with Chantel Yeh (HCP), Palliative care consult, DNR obtained. Per grandsarahi Yeh, granddaughter (Cony) is not making decisions for patient.     Problem/Plan - 3:  ·  Problem: GRAY (acute kidney injury). Plan: resolved with IVF hydration, monitor BMP.        Problem/Plan - 4:  ·  Problem: UTI (urinary tract infection). Plan: completed 7day course of zosyn     Problem/Plan - 5:  ·  Problem: Lobar pneumonia. likely 2/2 aspiration event - gram negative bacteria Plan: completed 7day course of Zosyn  Chest PT  Incentive spirometry  Duonebs q6hr.     Problem/Plan - 6:  Problem: New onset AFIBB. Plan:  Cardio consult, increase Cardizem to 30 mg QID, patient is not a candidate for AC.      Problem/Plan - 7:  ·  Problem: Dementia with behavioral disturbance, unspecified dementia type. Plan: c/w Seroquel. Palliative Consult, patient DNR, Morphine as needed.     Problem/Plan - 8:  ·  Problem: Pleural Effusion.  Plan: D/C IV fluids.    Problem/Plan-9:  Problem: Dysphagia. Plan: Nectar thickened liquids. Aspiration precautions

## 2018-04-04 NOTE — PROGRESS NOTE ADULT - PROBLEM SELECTOR PROBLEM 1
Encephalopathy
Acute febrile illness
Acute febrile illness
Chronic atrial fibrillation
Septic shock

## 2018-04-04 NOTE — CONSULT NOTE ADULT - PROBLEM SELECTOR PROBLEM 1
Acute febrile illness
Chronic atrial fibrillation
Hypernatremia
Alzheimer's dementia without behavioral disturbance, unspecified timing of dementia onset

## 2018-04-04 NOTE — CONSULT NOTE ADULT - SUBJECTIVE AND OBJECTIVE BOX
HPI: 89F with PMH as listed admitted 3/29 with altered mental status. Prior to admission, she was diagnosed with a UTI by a visiting nurse. Admitted to MICU with severe sepsis, GRAY, hypernatremia, UTI/PNA, and encephalopathy. She required intubation briefly, extubated 3/31. She was transferred to the regular medical floors, has continued to have delirium, poor mental state, now with respiratory distress.     PERTINENT PMH REVIEWED: Yes     PAST MEDICAL & SURGICAL HISTORY:  Dementia with behavioral disturbance, unspecified dementia type  Depression  Hypothyroidism  DJD (degenerative joint disease)  Hypertension  H/O: hysterectomy    SOCIAL HISTORY:  former smoker                                   Admitted from:  home      Surrogate- son Rao     FAMILY HISTORY:  No significant family history    Baseline ADLs (prior to admission):  Independent/ Dependent      Allergies    No Known Allergies    Present Symptoms:     Dyspnea: 0 1 2 3   Nausea/Vomiting: Yes No  Anxiety:  Yes No  Depression: Yes No  Fatigue: Yes No  Loss of appetite: Yes No    Pain:             Character-            Duration-            Effect-            Factors-            Frequency-            Location-            Severity-    Review of Systems: Reviewed                     Negative:                     Positive:  Unable to obtain due to poor mentation   All others negative    MEDICATIONS  (STANDING):  aspirin  chewable 81 milliGRAM(s) Oral daily  BACItracin   Ointment 1 Application(s) Topical every 12 hours  dextrose 5%. 1000 milliLiter(s) (50 mL/Hr) IV Continuous <Continuous>  dextrose 5%. 1000 milliLiter(s) (75 mL/Hr) IV Continuous <Continuous>  dextrose 50% Injectable 12.5 Gram(s) IV Push once  dextrose 50% Injectable 25 Gram(s) IV Push once  dextrose 50% Injectable 25 Gram(s) IV Push once  diltiazem    Tablet 30 milliGRAM(s) Oral every 6 hours  insulin lispro (HumaLOG) corrective regimen sliding scale   SubCutaneous every 6 hours  levothyroxine 12.5 MICROGram(s) Oral daily  piperacillin/tazobactam IVPB. 3.375 Gram(s) IV Intermittent every 12 hours  potassium chloride   Powder 40 milliEquivalent(s) Oral every 4 hours  QUEtiapine 25 milliGRAM(s) Oral at bedtime  saccharomyces boulardii 250 milliGRAM(s) Oral two times a day    MEDICATIONS  (PRN):  acetaminophen   Tablet 650 milliGRAM(s) Oral every 6 hours PRN For Temp greater than 38 C (100.4 F)  ALBUTerol/ipratropium for Nebulization 3 milliLiter(s) Nebulizer every 6 hours PRN Shortness of Breath  dextrose Gel 1 Dose(s) Oral once PRN Blood Glucose LESS THAN 70 milliGRAM(s)/deciLiter  glucagon  Injectable 1 milliGRAM(s) IntraMuscular once PRN Glucose <70 milliGRAM(s)/deciLiter  levalbuterol Inhalation 0.63 milliGRAM(s) Inhalation every 6 hours PRN SOB  morphine  - Injectable 1 milliGRAM(s) IV Push every 4 hours PRN Severe Pain (7 - 10) or shortness of breath    PHYSICAL EXAM:    Vital Signs Last 24 Hrs  T(C): 36.4 (04 Apr 2018 07:25), Max: 36.9 (03 Apr 2018 16:55)  T(F): 97.5 (04 Apr 2018 07:25), Max: 98.5 (03 Apr 2018 16:55)  HR: 41 (04 Apr 2018 07:25) (41 - 95)  BP: 152/72 (04 Apr 2018 07:25) (142/83 - 157/81)  BP(mean): --  RR: 18 (04 Apr 2018 07:25) (17 - 95)  SpO2: 94% (04 Apr 2018 07:25) (94% - 97%)    General: alert  oriented x ____ lethargic agitated                  cachexia  nonverbal  coma    Karnofsky:  %    HEENT: normal  dry mouth  ET tube/trach    Lungs: comfortable tachypnea/labored breathing  excessive secretions    CV: normal  tachycardia    GI: normal  distended  tender  no BS               PEG/NG/OG tube  constipation  last BM:     : normal  incontinent  oliguria/anuria  elizabeth    MSK: normal  weakness  edema             ambulatory  bedbound/wheelchair bound    Skin: normal  pressure ulcers- Stage_____  no rash    LABS:                      10.4   11.2  )-----------( 230      ( 03 Apr 2018 07:53 )             32.2     04-04    151<H>  |  112<H>  |  8.0  ----------------------------<  77  3.1<L>   |  26.0  |  0.47<L>    Ca    8.1<L>      04 Apr 2018 09:01  Phos  2.9     04-03  Mg     1.8     04-03    I&O's Summary    RADIOLOGY & ADDITIONAL STUDIES:    < from: Xray Chest 1 View AP/PA. (04.02.18 @ 10:30) >    Discontinuation of support devices. Mild bibasilar opacities as described above.    < from: CT Abdomen and Pelvis w/Cont (08.08.14 @ 20:08) >   Diffuse bowel wall thickening of the splenic flexure ,also involving distal transverse colon and proximal left colon,consistent with colitis either infectious, inflammatory or ischemic .. No free air. Mild amount of abdominal pelvic ascites.Old fracture deformity of L1 vertebra with severe facet arthrosis.Sigmoid diverticulosis with wall thickening likely resulting from fibrosis from prior bouts of diverticulitis.    ADVANCE DIRECTIVES: DNR/I HPI: 89F with PMH as listed admitted 3/29 with altered mental status. Prior to admission, she was diagnosed with a UTI by a visiting nurse. Admitted to MICU with severe sepsis, GRAY, hypernatremia, UTI/PNA, and encephalopathy. She required intubation briefly, extubated 3/31. She was transferred to the regular medical floors, has continued to have delirium, poor mental state, now with respiratory distress.     PERTINENT PMH REVIEWED: Yes     PAST MEDICAL & SURGICAL HISTORY:  Dementia with behavioral disturbance, unspecified dementia type  Depression  Hypothyroidism  DJD (degenerative joint disease)  Hypertension  H/O: hysterectomy    SOCIAL HISTORY:  former smoker                                   Admitted from:  home      Surrogate- Grandson Rao     FAMILY HISTORY:  No significant family history    Baseline ADLs (prior to admission):  Dependent      Allergies    No Known Allergies    Present Symptoms:     Dyspnea: 0   Nausea/Vomiting: No  Anxiety:  Yes   Depression: No  Fatigue: Yes   Loss of appetite: Yes     Pain: yes prior to wound changes, moans             Character-            Duration-            Effect-            Factors-            Frequency-            Location-            Severity-    Review of Systems: Reviewed                     Negative:                     Positive:  Unable to obtain due to poor mentation   All others negative    MEDICATIONS  (STANDING):  aspirin  chewable 81 milliGRAM(s) Oral daily  BACItracin   Ointment 1 Application(s) Topical every 12 hours  dextrose 5%. 1000 milliLiter(s) (50 mL/Hr) IV Continuous <Continuous>  dextrose 5%. 1000 milliLiter(s) (75 mL/Hr) IV Continuous <Continuous>  dextrose 50% Injectable 12.5 Gram(s) IV Push once  dextrose 50% Injectable 25 Gram(s) IV Push once  dextrose 50% Injectable 25 Gram(s) IV Push once  diltiazem    Tablet 30 milliGRAM(s) Oral every 6 hours  insulin lispro (HumaLOG) corrective regimen sliding scale   SubCutaneous every 6 hours  levothyroxine 12.5 MICROGram(s) Oral daily  piperacillin/tazobactam IVPB. 3.375 Gram(s) IV Intermittent every 12 hours  potassium chloride   Powder 40 milliEquivalent(s) Oral every 4 hours  QUEtiapine 25 milliGRAM(s) Oral at bedtime  saccharomyces boulardii 250 milliGRAM(s) Oral two times a day    MEDICATIONS  (PRN):  acetaminophen   Tablet 650 milliGRAM(s) Oral every 6 hours PRN For Temp greater than 38 C (100.4 F)  ALBUTerol/ipratropium for Nebulization 3 milliLiter(s) Nebulizer every 6 hours PRN Shortness of Breath  dextrose Gel 1 Dose(s) Oral once PRN Blood Glucose LESS THAN 70 milliGRAM(s)/deciLiter  glucagon  Injectable 1 milliGRAM(s) IntraMuscular once PRN Glucose <70 milliGRAM(s)/deciLiter  levalbuterol Inhalation 0.63 milliGRAM(s) Inhalation every 6 hours PRN SOB  morphine  - Injectable 1 milliGRAM(s) IV Push every 4 hours PRN Severe Pain (7 - 10) or shortness of breath    PHYSICAL EXAM:    Vital Signs Last 24 Hrs  T(C): 36.4 (04 Apr 2018 07:25), Max: 36.9 (03 Apr 2018 16:55)  T(F): 97.5 (04 Apr 2018 07:25), Max: 98.5 (03 Apr 2018 16:55)  HR: 41 (04 Apr 2018 07:25) (41 - 95)  BP: 152/72 (04 Apr 2018 07:25) (142/83 - 157/81)  BP(mean): --  RR: 18 (04 Apr 2018 07:25) (17 - 95)  SpO2: 94% (04 Apr 2018 07:25) (94% - 97%)    General: alert  oriented x ____ lethargic agitated                  cachexia  nonverbal  coma    Karnofsky:  %    HEENT: normal  dry mouth  ET tube/trach    Lungs: comfortable tachypnea/labored breathing  excessive secretions    CV: normal  tachycardia    GI: normal  distended  tender  no BS               PEG/NG/OG tube  constipation  last BM:     : normal  incontinent  oliguria/anuria  elizabeth    MSK: normal  weakness  edema             ambulatory  bedbound/wheelchair bound    Skin: normal  pressure ulcers- Stage_____  no rash    LABS:                      10.4   11.2  )-----------( 230      ( 03 Apr 2018 07:53 )             32.2     04-04    151<H>  |  112<H>  |  8.0  ----------------------------<  77  3.1<L>   |  26.0  |  0.47<L>    Ca    8.1<L>      04 Apr 2018 09:01  Phos  2.9     04-03  Mg     1.8     04-03    I&O's Summary    RADIOLOGY & ADDITIONAL STUDIES:    < from: Xray Chest 1 View AP/PA. (04.02.18 @ 10:30) >    Discontinuation of support devices. Mild bibasilar opacities as described above.    < from: CT Abdomen and Pelvis w/Cont (08.08.14 @ 20:08) >   Diffuse bowel wall thickening of the splenic flexure ,also involving distal transverse colon and proximal left colon,consistent with colitis either infectious, inflammatory or ischemic .. No free air. Mild amount of abdominal pelvic ascites.Old fracture deformity of L1 vertebra with severe facet arthrosis.Sigmoid diverticulosis with wall thickening likely resulting from fibrosis from prior bouts of diverticulitis.    ADVANCE DIRECTIVES: DNR/I HPI: 89F with PMH as listed admitted 3/29 with altered mental status. Prior to admission, she was diagnosed with a UTI by a visiting nurse. Admitted to MICU with severe sepsis, GRAY, hypernatremia, UTI/PNA, and encephalopathy. She required intubation briefly, extubated 3/31. She was transferred to the regular medical floors, has continued to have delirium, poor mental state, now with respiratory distress.     PERTINENT PMH REVIEWED: Yes     PAST MEDICAL & SURGICAL HISTORY:  Dementia with behavioral disturbance, unspecified dementia type  Depression  Hypothyroidism  DJD (degenerative joint disease)  Hypertension  H/O: hysterectomy    SOCIAL HISTORY:  former smoker                                   Admitted from:  home      Surrogate- Grandson Rao     FAMILY HISTORY:  No significant family history    Baseline ADLs (prior to admission):  Dependent      Allergies    No Known Allergies    Present Symptoms:     Dyspnea: 0   Nausea/Vomiting: No  Anxiety:  Yes   Depression: No  Fatigue: Yes   Loss of appetite: Yes     Pain: yes prior to wound changes, moans             Character-            Duration-            Effect-            Factors-            Frequency-            Location-            Severity-    Review of Systems: Reviewed               Unable to obtain due to poor mentation   All others negative    MEDICATIONS  (STANDING):  aspirin  chewable 81 milliGRAM(s) Oral daily  BACItracin   Ointment 1 Application(s) Topical every 12 hours  dextrose 5%. 1000 milliLiter(s) (50 mL/Hr) IV Continuous <Continuous>  dextrose 5%. 1000 milliLiter(s) (75 mL/Hr) IV Continuous <Continuous>  dextrose 50% Injectable 12.5 Gram(s) IV Push once  dextrose 50% Injectable 25 Gram(s) IV Push once  dextrose 50% Injectable 25 Gram(s) IV Push once  diltiazem    Tablet 30 milliGRAM(s) Oral every 6 hours  insulin lispro (HumaLOG) corrective regimen sliding scale   SubCutaneous every 6 hours  levothyroxine 12.5 MICROGram(s) Oral daily  piperacillin/tazobactam IVPB. 3.375 Gram(s) IV Intermittent every 12 hours  potassium chloride   Powder 40 milliEquivalent(s) Oral every 4 hours  QUEtiapine 25 milliGRAM(s) Oral at bedtime  saccharomyces boulardii 250 milliGRAM(s) Oral two times a day    MEDICATIONS  (PRN):  acetaminophen   Tablet 650 milliGRAM(s) Oral every 6 hours PRN For Temp greater than 38 C (100.4 F)  ALBUTerol/ipratropium for Nebulization 3 milliLiter(s) Nebulizer every 6 hours PRN Shortness of Breath  dextrose Gel 1 Dose(s) Oral once PRN Blood Glucose LESS THAN 70 milliGRAM(s)/deciLiter  glucagon  Injectable 1 milliGRAM(s) IntraMuscular once PRN Glucose <70 milliGRAM(s)/deciLiter  levalbuterol Inhalation 0.63 milliGRAM(s) Inhalation every 6 hours PRN SOB  morphine  - Injectable 1 milliGRAM(s) IV Push every 4 hours PRN Severe Pain (7 - 10) or shortness of breath    PHYSICAL EXAM:    Vital Signs Last 24 Hrs  T(C): 36.4 (04 Apr 2018 07:25), Max: 36.9 (03 Apr 2018 16:55)  T(F): 97.5 (04 Apr 2018 07:25), Max: 98.5 (03 Apr 2018 16:55)  HR: 41 (04 Apr 2018 07:25) (41 - 95)  BP: 152/72 (04 Apr 2018 07:25) (142/83 - 157/81)  BP(mean): --  RR: 18 (04 Apr 2018 07:25) (17 - 95)  SpO2: 94% (04 Apr 2018 07:25) (94% - 97%)    General: lethargic, severe cachexia     Karnofsky:  20 %    HEENT: dry mouth      Lungs: tachypnea     CV: normal      GI: normal       : incontinent      MSK: bedbound     Skin: no rash    LABS:                      10.4   11.2  )-----------( 230      ( 03 Apr 2018 07:53 )             32.2     04-04    151<H>  |  112<H>  |  8.0  ----------------------------<  77  3.1<L>   |  26.0  |  0.47<L>    Ca    8.1<L>      04 Apr 2018 09:01  Phos  2.9     04-03  Mg     1.8     04-03    I&O's Summary    RADIOLOGY & ADDITIONAL STUDIES:    < from: Xray Chest 1 View AP/PA. (04.02.18 @ 10:30) >    Discontinuation of support devices. Mild bibasilar opacities as described above.    < from: CT Abdomen and Pelvis w/Cont (08.08.14 @ 20:08) >   Diffuse bowel wall thickening of the splenic flexure ,also involving distal transverse colon and proximal left colon, consistent with colitis either infectious, inflammatory or ischemic .. No free air. Mild amount of abdominal pelvic ascites. Old fracture deformity of L1 vertebra with severe facet arthrosis. Sigmoid diverticulosis with wall thickening likely resulting from fibrosis from prior bouts of diverticulitis.    ADVANCE DIRECTIVES: DNR/I

## 2018-04-04 NOTE — PROGRESS NOTE ADULT - ASSESSMENT
This is a 89 y/o F PMHx dementia, DM, HTN and hypothyroidism, BIBA to ED for altered mental status for the past three days.

## 2018-04-04 NOTE — PROGRESS NOTE ADULT - SUBJECTIVE AND OBJECTIVE BOX
CC: AMS    HPI:  This is a 89 y/o F PMHx dementia, DM, HTN and hypothyroidism, BIBA to ED for altered mental status for the past three days. EMS states pt lives with family and has been declining for the past three days after a UTI diagnosed by a visiting at home nurse. Pt was placed on Macrobid for UTI. At baseline pt is able to ambulate with a walker and is verbal. EMS found pt hypotensive, tachycardic and warm to the touch when they arrived. History verified from her son and NOK Mr. Hitesh Perera over phone who has been her caretaker for years. As per son, patient ambulates with walker at baseline and sometimes get agitated.  Mr Hitesh Perera is currently not well and states that Rao Perera (Patient's grandson) is to be making decisions on patient's behalf.        INTERVAL HPI/OVERNIGHT EVENTS:  Patient seen and examined lying in bed. Patient appears comfortable at present. No acute events overnight.    Vital Signs Last 24 Hrs  T(C): 36.4 (04 Apr 2018 07:25), Max: 36.9 (03 Apr 2018 16:55)  T(F): 97.5 (04 Apr 2018 07:25), Max: 98.5 (03 Apr 2018 16:55)  HR: 41 (04 Apr 2018 07:25) (41 - 95)  BP: 152/72 (04 Apr 2018 07:25) (142/83 - 157/81)  BP(mean): --  RR: 18 (04 Apr 2018 07:25) (17 - 95)  SpO2: 94% (04 Apr 2018 07:25) (94% - 97%)  I&O's Detail      PHYSICAL EXAM:  GENERAL: NAD  HEAD:  Atraumatic, Normocephalic  NECK: Supple, No JVD, Normal thyroid  NERVOUS SYSTEM:  Alert, generalized weakness  CHEST/LUNG: Diminished BS bilaterally  HEART: Regular rate and rhythm; No murmurs, rubs, or gallops  ABDOMEN: Soft, Nontender, Nondistended; Bowel sounds present  EXTREMITIES:  2+ Peripheral Pulses, No clubbing, cyanosis, or edema                            10.4   11.2  )-----------( 230      ( 03 Apr 2018 07:53 )             32.2     04 Apr 2018 09:01    151    |  112    |  8.0    ----------------------------<  77     3.1     |  26.0   |  0.47     Ca    8.1        04 Apr 2018 09:01  Phos  2.9       03 Apr 2018 07:53  Mg     1.8       03 Apr 2018 07:53        CAPILLARY BLOOD GLUCOSE      POCT Blood Glucose.: 76 mg/dL (04 Apr 2018 12:59)  POCT Blood Glucose.: 86 mg/dL (04 Apr 2018 09:05)  POCT Blood Glucose.: 85 mg/dL (04 Apr 2018 00:01)  POCT Blood Glucose.: 83 mg/dL (03 Apr 2018 17:02)        Hemoglobin A1C, Whole Blood: 5.4 % (03-30-18 @ 05:32)    MEDICATIONS  (STANDING):  aspirin  chewable 81 milliGRAM(s) Oral daily  BACItracin   Ointment 1 Application(s) Topical every 12 hours  dextrose 5%. 1000 milliLiter(s) (50 mL/Hr) IV Continuous <Continuous>  dextrose 5%. 1000 milliLiter(s) (75 mL/Hr) IV Continuous <Continuous>  dextrose 50% Injectable 12.5 Gram(s) IV Push once  dextrose 50% Injectable 25 Gram(s) IV Push once  dextrose 50% Injectable 25 Gram(s) IV Push once  diltiazem    Tablet 30 milliGRAM(s) Oral every 6 hours  insulin lispro (HumaLOG) corrective regimen sliding scale   SubCutaneous every 6 hours  levothyroxine 12.5 MICROGram(s) Oral daily  piperacillin/tazobactam IVPB. 3.375 Gram(s) IV Intermittent every 12 hours  potassium chloride   Powder 40 milliEquivalent(s) Oral every 4 hours  QUEtiapine 25 milliGRAM(s) Oral at bedtime  saccharomyces boulardii 250 milliGRAM(s) Oral two times a day    MEDICATIONS  (PRN):  acetaminophen   Tablet 650 milliGRAM(s) Oral every 6 hours PRN For Temp greater than 38 C (100.4 F)  ALBUTerol/ipratropium for Nebulization 3 milliLiter(s) Nebulizer every 6 hours PRN Shortness of Breath  dextrose Gel 1 Dose(s) Oral once PRN Blood Glucose LESS THAN 70 milliGRAM(s)/deciLiter  glucagon  Injectable 1 milliGRAM(s) IntraMuscular once PRN Glucose <70 milliGRAM(s)/deciLiter  levalbuterol Inhalation 0.63 milliGRAM(s) Inhalation every 6 hours PRN SOB  morphine  - Injectable 1 milliGRAM(s) IV Push every 4 hours PRN Severe Pain (7 - 10) or shortness of breath

## 2018-04-04 NOTE — PROVIDER CONTACT NOTE (OTHER) - ACTION/TREATMENT ORDERED:
no action ordered at this time, acknowledged
PA will come if avaliable to night and try it himself.
Changing morphine to subQ

## 2018-04-04 NOTE — CONSULT NOTE ADULT - PROBLEM SELECTOR PROBLEM 3
Urinary tract infection without hematuria, site unspecified
GRAY (acute kidney injury)
Shortness of breath

## 2018-04-04 NOTE — PROGRESS NOTE ADULT - ATTENDING COMMENTS
1) GRAY; prerenal  2) Hypernatremia  3) Hypokalemia  4) Dehydration    Continue with free water supplementation  Change all drips to D5  Hypernatremia improving  Renal fx improving  Signing off  dw Dr Ayala    Please recall if needed
pt and family at bedside.   Grandson now making decision as father who is HCP is unable to come to hospital, He gives permission to Rao Perera to make the decisions in his absence, decision confirmed over phone.  Rao wants her grandmother to be comfortable. continue current care , does not want any blood work. DNR/DNI. discussed at length with family.  palliative eval.
pt seen at bedside, in mild respiratory distress, jeanine Rao made aware, Lasix iv tried, with only marginal relief.  Will add morphine for comfort care. Rao is agreeable to this.   still pending palliative eval. will request Hospice consult.
will sign off . NO further recommendations . call for any questions.   Thank you for allowing me to participate in care of your patient.   Please call as needed.
I saw this patient independently at 0730 and agree with the above; she was performing well on her breathing trial and the decision was made this AM for extubation.  She tolerated well.  Changed the IVF to 0.45 NS from plasmalyte as she appears euvolemic at this point with continued mild free water deficit to be addressed now with the hypotonic fluids.  Delirium persists but is improved from prior; she had a pre-existing UTI on ABX -- her UA here was bland and no significant source noted; continued ID evaluation but continued antibiotics for now.  I updated the son, Hitesh Perera, [phone] this AM (who is also sick and unable to come).
I saw this patient independently and agree with the above.  d/c cvc, transfer to [facility].

## 2018-04-04 NOTE — CONSULT NOTE ADULT - PROBLEM SELECTOR RECOMMENDATION 4
Likely multifactorial- severe hypernatremia, metabolic encephalopathy secondary to severe sepsis, UTI. Continue to treat underlying causes as stated above. Monitor mental status for acute changes. Aspiration precautions. May require 1-1 constant observation.
-met with grandsarahi Yeh and his wife. Patients son Hitesh is currently sick and defers decision making to his son Rao. They understand poor prognosis and the most important thing at this point to make sure she is comfortable. I did discuss hospice with them. Eventually they would like to take her home, but I expressed to them that her symptom burdens may be too great right now to be able to take her home. Will have hospice reach out to jeanine Yeh to discuss options.

## 2018-04-04 NOTE — CONSULT NOTE ADULT - ASSESSMENT
THIS 89 Y.O. F HERE FOR DELIRIUM WITH BASELINE DEMENTIA, RECENT UTI, NOW FOUND WITH:    FEVERS  SEPSIS  POSITIVE UA LIKELY UTI  RECTROCARDIAC / LLL INFILTRATE POSSIBLE PNA    WBC ELEVATION  ACUTE RENAL FAILURE  HYPERNATREMIA  ENCEPHALOPATHY    - FOLLOW UP CULTURES  - CONTINUE ZOSYN Q12H  - NEED CLOSE MONITORING OF ELECTROLYTES - CONSIDER ICU CONSULTATION  - TREND RENAL FUNCTION FOR HYPERNATREMIA AND ARF; NEPHROLOGY FOLLOWING.
89 y/o F with a h/o dementia, DM, HTN, hypothyroidism, with severe hypernatremia, dehydration, severe sepsis, UTI, pneumonia, GRAY, encephalopathy, hyperglycemia.    Will admit patient to MICU for further management. Case discussed in detail with MICU attending, Dr. Ayala.
[place] [facility]ARDIOLOG[facility]                                                        Office: [address] [address]                                                       Telephone: [phone]. Fax:[phone]                                                              CARDIOLOGY CONSULTATION NOTE                                                                                             Consult requested by:  Ashlie Key MD (Hospitalist)     Reason for Consultation: atrial fibrillation     History obtained by: Patient and medical record     obtained: No; Japanese speaking. altered . limited history     Chief complaint:    Patient is a 89y old  Female who presents with a chief complaint of AMS, (30 Mar 2018 01:47)      HPI:  This is a 89 y/o F PMHx dementia, DM, HTN and hypothyroidism, BIBA to ED for altered mental status for the past three days. EMS states pt lives with family and has been declining for the past three days after a UTI diagnosed by a visiting at home nurse. Pt was placed on Macrobid for UTI. At baseline pt is able to ambulate with a walker and is verbal. EMS found pt hypotensive, tachycardic and warm to the touch when they arrived. Pt is on Metoprolol, Lasix, Macrobid, Aspirin and Remeron. As per son he states pt is not diabetic, she is not on any diabetic medications. He also reports pt had blood drawn on the 21st, they received a call yesterday where they were informed pt had an elevated WBC. Three days ago pt also began to be incontinent which is abnormal for her. history verified from her son and NOK Mr. Hitesh Perera over phone who has been her caretaker for years. as per son, patient ambulates with walker at baseline and sometimes get agitated.she is currently not herself. (29 Mar 2018 17:01)    Above history reviewed.  Agreed .      REVIEW OF SYMPTOMS: cannot be obtained. denies all symptoms.     ALLERGIES: Allergies    No Known Allergies    Intolerances          CURRENT MEDICATIONS:  diltiazem    Tablet 30 milliGRAM(s) Oral every 6 hours     QUEtiapine  ascorbic acid IVPB  aspirin  chewable  BACItracin   Ointment  dextrose 5%.  dextrose 50% Injectable  dextrose 50% Injectable  dextrose 50% Injectable  heparin  Injectable  insulin lispro (HumaLOG) corrective regimen sliding scale  levothyroxine  piperacillin/tazobactam IVPB.  potassium chloride   Powder  saccharomyces boulardii      HOME MEDICATIONS:    PAST MEDICAL HISTORY  Dementia with behavioral disturbance, unspecified dementia type  Depression  Hypothyroidism  DJD (degenerative joint disease)  Hypertension      PAST SURGICAL HISTORY  H/O: hysterectomy      FAMILY HISTORY:  No significant family history      SOCIAL HISTORY:  Denies smoking/alcohol/drugs      Vital Signs Last 24 Hrs  T(C): 36.4 (02 Apr 2018 16:58), Max: 37.2 (02 Apr 2018 03:01)  T(F): 97.5 (02 Apr 2018 16:58), Max: 98.9 (02 Apr 2018 03:01)  HR: 112 (02 Apr 2018 03:37) (95 - 143)  BP: 145/85 (02 Apr 2018 16:58) (118/69 - 145/85)  BP(mean): --  RR: 18 (02 Apr 2018 03:01) (18 - 18)  SpO2: 94% (02 Apr 2018 03:37) (94% - 99%)      PHYSICAL EXAM:  Constitutional: Comfortable . No acute distress.   HEENT: Atraumatic and normcephalic , neck is supple . no JVD. No carotid bruit. PEERL poor skin tugor. dry mucous membranes.   CNS: A&Ox1. No focal deficits. EOMI . follows command.   Lymph Nodes: Cervical : Not palpable.  Respiratory: CTAB  Cardiovascular: S1S2 RRR. No murmur/rubs or gallop.  Gastrointestinal: Soft non-tender and non distended . +Bowel sounds. negative Gallardo's sign.  Extremities: No edema.   Psychiatric: Calm . no agitation.  Skin:  skin tags and skin ulceration on the arms and legs.  Bruis of the right arm.   Intake and output:   04-01 @ 07:01  -  04-02 @ 07:00  --------------------------------------------------------  IN: 405 mL / OUT: 0 mL / NET: 405 mL        LABS:                        10.6   11.8  )-----------( 195      ( 02 Apr 2018 04:41 )             32.6     04-02    146<H>  |  112<H>  |  16.0  ----------------------------<  88  3.2<L>   |  19.0<L>  |  0.52    Ca    7.6<L>      02 Apr 2018 04:41  Phos  2.0     04-02  Mg     1.9     04-02    TPro  5.1<L>  /  Alb  2.6<L>  /  TBili  0.5  /  DBili  x   /  AST  29  /  ALT  25  /  AlkPhos  47  04-02      ;p-BNP=Serum Pro-Brain Natriuretic Peptide: 87739 pg/mL (04-02 @ 11:16)          INTERPRETATION OF TELEMETRY: Reviewed by me. atrial fibrillation with rapid ventricular rate   ECG: Reviewed by me. Afib Non-specific ST- T changes     RADIOLOGY & ADDITIONAL STUDIES:    X-ray:  reviewed by me. unremarkable     ECHO FINDINGS: Date:     LVEF 75%. Grade I Diastolic dysfunction . no effusion, mild to moderate MR.
89F with dementia here with uti/pneumonia, persistent encephalopathy, debility, pain related to wounds on extremities, cachexia, now with tachypnea, at end of life, now on comfort measures only.

## 2018-04-04 NOTE — PROGRESS NOTE ADULT - SUBJECTIVE AND OBJECTIVE BOX
[place] CARDIOLOGY-[facility] Practice                                                        Office: [address] [address]                                                       Telephone: [phone]. Fax:[phone]                                                                             PROGRESS NOTE   Reason for follow up: atrial fibrillation                             Overnight: No new events.   Update: Patient DNR/DNI. hospice consulted.     Subjective: Cannot be obtained. COnfused.    Complains of: no new symptoms.  cannot be obtained follows command.   Review of symptoms: denies . Unreliable.     Past medical history: No updates.   Chronic conditions:  Hypertension: controlled; Afib: controlled.   	  Vitals:  T(C): 37.1 (04-04-18 @ 15:48), Max: 37.1 (04-04-18 @ 15:48)  HR: 105 (04-04-18 @ 15:48) (41 - 105)  BP: 109/70 (04-04-18 @ 15:48) (109/70 - 157/81)  RR: 18 (04-04-18 @ 07:25) (18 - 18)  SpO2: 94% (04-04-18 @ 07:25) (94% - 97%)  Wt(kg): --  I&O's Summary        PHYSICAL EXAM:  Constitutional: Comfortable . No acute distress.   HEENT: Atraumatic and normcephalic , neck is supple . no JVD. No carotid bruit. PEERL poor skin tugor. dry mucous membranes.   CNS: A&Ox1. No focal deficits. EOMI . follows command.   Lymph Nodes: Cervical : Not palpable.  Respiratory: CTAB  Cardiovascular: S1S2 RRR. No murmur/rubs or gallop.  Gastrointestinal: Soft non-tender and non distended . +Bowel sounds. negative Gallardo's sign.  Extremities: No edema.   Psychiatric: Calm . no agitation.  Skin:  skin tags and skin ulceration on the arms and legs.  Bruis of the right arm.     CURRENT MEDICATIONS:    morphine  - Injectable  QUEtiapine  levothyroxine  aspirin  chewable  BACItracin   Ointment      LABS:	 	  CARDIAC MARKERS ( 02 Apr 2018 11:16 )  x     / x     / x     / x     / x      p-BNP 02 Apr 2018 11:16: 95386 pg/mL                            10.4   11.2  )-----------( 230      ( 03 Apr 2018 07:53 )             32.2     04-04    151<H>  |  112<H>  |  8.0  ----------------------------<  77  3.1<L>   |  26.0  |  0.47<L>    Ca    8.1<L>      04 Apr 2018 09:01  Phos  2.9     04-03  Mg     1.8     04-03      proBNP: Serum Pro-Brain Natriuretic Peptide: 63838 pg/mL (04-02 @ 11:16)    Lipid Profile:   HgA1c: Hemoglobin A1C, Whole Blood: 5.4 %  TSH: Thyroid Stimulating Hormone, Serum: 0.33 uIU/mL    	INTERPRETATION OF TELEMETRY: Reviewed by me. atrial fibrillation with rapid ventricular rate   ECG: Reviewed by me. Afib Non-specific ST- T changes     RADIOLOGY & ADDITIONAL STUDIES:    X-ray:  reviewed by me. unremarkable     ECHO FINDINGS: Date:     LVEF 75%. Grade I Diastolic dysfunction . no effusion, mild to moderate MR.

## 2018-04-04 NOTE — CONSULT NOTE ADULT - PROBLEM SELECTOR RECOMMENDATION 2
[facility] meds.
Likely secondary to UTI, possible pneumonia component. Hypotension resolved with IVF boluses. Continue IVF hydration given severe hypovolemia. Empiric antibiotic therapy with Zosyn. F/u pancultures and adjust antibiotics accordingly. Lactate: 3.2 --> 2.5, will trend until clears.
- moaning and agitated most of the day, but mostly during wound care.  -morphine ATC and PRN

## 2018-04-05 LAB
CULTURE RESULTS: SIGNIFICANT CHANGE UP
CULTURE RESULTS: SIGNIFICANT CHANGE UP
SPECIMEN SOURCE: SIGNIFICANT CHANGE UP
SPECIMEN SOURCE: SIGNIFICANT CHANGE UP

## 2018-04-05 PROCEDURE — 99233 SBSQ HOSP IP/OBS HIGH 50: CPT

## 2018-04-05 RX ADMIN — MORPHINE SULFATE 2 MILLIGRAM(S): 50 CAPSULE, EXTENDED RELEASE ORAL at 00:34

## 2018-04-05 RX ADMIN — MORPHINE SULFATE 2 MILLIGRAM(S): 50 CAPSULE, EXTENDED RELEASE ORAL at 06:38

## 2018-04-05 RX ADMIN — MORPHINE SULFATE 2 MILLIGRAM(S): 50 CAPSULE, EXTENDED RELEASE ORAL at 13:48

## 2018-04-05 RX ADMIN — MORPHINE SULFATE 2 MILLIGRAM(S): 50 CAPSULE, EXTENDED RELEASE ORAL at 19:01

## 2018-04-05 RX ADMIN — Medication 1 APPLICATION(S): at 18:45

## 2018-04-05 RX ADMIN — Medication 1 APPLICATION(S): at 06:39

## 2018-04-05 RX ADMIN — MORPHINE SULFATE 2 MILLIGRAM(S): 50 CAPSULE, EXTENDED RELEASE ORAL at 14:03

## 2018-04-05 RX ADMIN — MORPHINE SULFATE 2 MILLIGRAM(S): 50 CAPSULE, EXTENDED RELEASE ORAL at 18:44

## 2018-04-05 NOTE — PROCEDURE NOTE - NSINFORMCONSENT_GEN_A_CORE
This was an emergent procedure.
Benefits, risks, and possible complications of procedure explained to patient/caregiver who verbalized understanding and gave verbal consent.
Benefits, risks, and possible complications of procedure explained to patient/caregiver who verbalized understanding and gave verbal consent./patient's son Hesham Perera
This was an emergent procedure.

## 2018-04-05 NOTE — PROCEDURE NOTE - ADDITIONAL PROCEDURE DETAILS
Site #2 for blood cultures
sit #1 for blood cultures
Midline catheter length: 10cm  Arm circumference: 25cm

## 2018-04-05 NOTE — PROCEDURE NOTE - NSCOMPLICATION_GEN_A_CORE
no complications
no complications/swelling at site

## 2018-04-05 NOTE — PROGRESS NOTE ADULT - SUBJECTIVE AND OBJECTIVE BOX
CC: AMS/Sepsis    INTERVAL HPI/OVERNIGHT EVENTS: IV infiltrate last night. Will need mid line. Patient seen and examined lying in bed. Patient appears comfortable at present.       Vital Signs Last 24 Hrs  T(C): 36.8 (05 Apr 2018 07:35), Max: 37.1 (04 Apr 2018 15:48)  T(F): 98.2 (05 Apr 2018 07:35), Max: 98.7 (04 Apr 2018 15:48)  HR: 115 (05 Apr 2018 07:35) (105 - 115)  BP: 109/70 (04 Apr 2018 15:48) (109/70 - 109/70)  BP(mean): --  RR: 19 (05 Apr 2018 07:35) (19 - 19)  SpO2: 95% (05 Apr 2018 07:35) (95% - 95%)    PHYSICAL EXAM:    GENERAL: NAD  NECK: soft, Supple  CHEST/LUNG: base crackles B/L  HEART: S1S2+, Irregular rate and rhythm; No murmurs  ABDOMEN: Soft, Nontender, Nondistended; Bowel sounds present  EXTREMITIES:   No clubbing, cyanosis, RUL - edematous, weeping,  fragile skin, skin tears+  NEURO: awake, alert, follows commands, Oriented x 1 - self only.        I&O's Detail            04 Apr 2018 09:01    151    |  112    |  8.0    ----------------------------<  77     3.1     |  26.0   |  0.47     Ca    8.1        04 Apr 2018 09:01        CAPILLARY BLOOD GLUCOSE      POCT Blood Glucose.: 76 mg/dL (04 Apr 2018 12:59)          MEDICATIONS  (STANDING):  aspirin  chewable 81 milliGRAM(s) Oral daily  BACItracin   Ointment 1 Application(s) Topical every 12 hours  levothyroxine 12.5 MICROGram(s) Oral daily  morphine  - Injectable 2 milliGRAM(s) IV Push every 6 hours  QUEtiapine 25 milliGRAM(s) Oral at bedtime  saccharomyces boulardii 250 milliGRAM(s) Oral two times a day    MEDICATIONS  (PRN):  acetaminophen   Tablet 650 milliGRAM(s) Oral every 6 hours PRN For Temp greater than 38 C (100.4 F)  ALBUTerol/ipratropium for Nebulization 3 milliLiter(s) Nebulizer every 6 hours PRN Shortness of Breath  levalbuterol Inhalation 0.63 milliGRAM(s) Inhalation every 6 hours PRN SOB  morphine  - Injectable 2 milliGRAM(s) IV Push every 2 hours PRN pain or dyspnea  morphine Concentrate 2 milliGRAM(s) Oral every 3 hours PRN Moderate Pain (4 - 6)      RADIOLOGY & ADDITIONAL TESTS:

## 2018-04-05 NOTE — PROCEDURE NOTE - NSPROCDETAILS_GEN_ALL_CORE
placement confirmed by auscultation/orogastric
all materials/supplies accounted for at end of procedure/location identified, draped/prepped, sterile technique used, needle inserted/introduced
patient pre-oxygenated, tube inserted, placement confirmed/connected to ventilator
all materials/supplies accounted for at end of procedure/location identified, draped/prepped, sterile technique used, needle inserted/introduced
sterile dressing applied/ultrasound guidance/sterile technique, catheter placed/location identified, draped/prepped, sterile technique used/supine position
ultrasound guidance/connected to a pressurized flush line/hemostasis with direct pressure, dressing applied/location identified, draped/prepped, sterile technique used, needle inserted/introduced/positive blood return obtained via catheter/Seldinger technique/all materials/supplies accounted for at end of procedure/sutured in place
lumen(s) aspirated and flushed/sterile dressing applied/ultrasound guidance/guidewire recovered/sterile technique, catheter placed

## 2018-04-05 NOTE — PROCEDURE NOTE - PROCEDURE DATE TIME, MLM
05-Apr-2018 13:31
30-Mar-2018 06:53
30-Mar-2018 23:28
30-Mar-2018 11:00
30-Mar-2018 06:56
30-Mar-2018 06:55
30-Mar-2018 23:30

## 2018-04-05 NOTE — PROCEDURE NOTE - NSINDICATIONS_GEN_A_CORE
blood sampling
feeds
blood sampling
critical patient/respiratory failure
hospice
critical patient/monitoring purposes
critical illness/emergency venous access/hypertonic/irritant infusion

## 2018-04-05 NOTE — PROCEDURE NOTE - NSSITEPREP_SKIN_A_CORE
Adherence to aseptic technique: hand hygiene prior to donning barriers (gown, gloves), don cap and mask, sterile drape over patient/alcohol
Adherence to aseptic technique: hand hygiene prior to donning barriers (gown, gloves), don cap and mask, sterile drape over patient/alcohol
chlorhexidine/Adherence to aseptic technique: hand hygiene prior to donning barriers (gown, gloves), don cap and mask, sterile drape over patient
chlorhexidine
chlorhexidine

## 2018-04-05 NOTE — PROGRESS NOTE ADULT - ASSESSMENT
89 y/o female pmhx of HTN, DM, Dementia, hypothyroidism admitted on 3/29 w/ septic shock 2/2 UTI and pneumonia  requiring short period of pressors, as well as acute hypoxic respiratory failure requiring intubation. Weaned off pressors and started on midodrine however she  had episodes of bradycardia on midodrine and it was discontinued. She was extubated on 3/31 and has done well on NC. She also had GRAY< hypernatremia which has improved with iv fluid resuscitation. transferred out of MICU 3/31.       Problem/Plan - 1:  ·  Problem: Septic shock.  Plan: Resolved, HD stable off pressors.   One bottle of blood cultures growing GPC, likely containment. Repeat cultures negative.  completed  zosyn for PNA/ UTI -       Problem/Plan - 2:  ·  Problem: Encephalopathy acute. Plan: delirium on Dementia  Possibly 2/2 toxic metabolic encephalopathy - as per Grandson was ambulating with walker one week PTA  Ct supportive care.  Discussion with sarahi Rao (HCP), Palliative care consult, DNR obtained. Per jeanine Yeh, granddaughter (Cony) is not making decisions for patient.     Problem/Plan - 3:  ·  Problem: GRAY (acute kidney injury). Plan: resolved with IVF hydration, monitor BMP.        Problem/Plan - 4:  ·  Problem: UTI (urinary tract infection). Plan: completed 7day course of zosyn     Problem/Plan - 5:  ·  Problem: Lobar pneumonia. likely 2/2 aspiration event - gram negative bacteria Plan: completed 7day course of Zosyn  Chest PT  Duonebs q6hr.     Problem/Plan - 6:  Problem: New onset AFIBB. Plan:  Cardio consult, increase Cardizem to 30 mg QID, patient is not a candidate for AC.      Problem/Plan - 7:  ·  Problem: Dementia with behavioral disturbance, unspecified dementia type. Plan: c/w Seroquel. Palliative following. Hospice eval, however as per Grandsarahi family not ready to discuss. Palliative care aware.      Problem/Plan - 8:  ·  Problem: Pleural Effusion.  Plan: IV fluids dc'd    Problem/Plan-9:  Problem: Dysphagia. Plan: Nectar thickened liquids. Aspiration precautions.

## 2018-04-05 NOTE — PROCEDURE NOTE - NSPOSTCAREGUIDE_GEN_A_CORE
Care for catheter as per unit/ICU protocols
Care for catheter as per unit/ICU protocols
Verbal/written post procedure instructions were given to patient/caregiver

## 2018-04-05 NOTE — PROCEDURE NOTE - NSPROCNAME_GEN_A_CORE
Arterial Puncture/Cannulation
PICC Line Insertion
Tracheal Intubation
Central Line Insertion
Gastric Intubation/Gastric Lavage
Arterial Puncture/Cannulation
Arterial Puncture/Cannulation

## 2018-04-06 ENCOUNTER — TRANSCRIPTION ENCOUNTER (OUTPATIENT)
Age: 83
End: 2018-04-06

## 2018-04-06 VITALS
TEMPERATURE: 99 F | OXYGEN SATURATION: 97 % | SYSTOLIC BLOOD PRESSURE: 119 MMHG | DIASTOLIC BLOOD PRESSURE: 61 MMHG | HEART RATE: 90 BPM | RESPIRATION RATE: 18 BRPM

## 2018-04-06 PROCEDURE — 96375 TX/PRO/DX INJ NEW DRUG ADDON: CPT

## 2018-04-06 PROCEDURE — 83935 ASSAY OF URINE OSMOLALITY: CPT

## 2018-04-06 PROCEDURE — 80202 ASSAY OF VANCOMYCIN: CPT

## 2018-04-06 PROCEDURE — 83735 ASSAY OF MAGNESIUM: CPT

## 2018-04-06 PROCEDURE — 83036 HEMOGLOBIN GLYCOSYLATED A1C: CPT

## 2018-04-06 PROCEDURE — 93005 ELECTROCARDIOGRAM TRACING: CPT

## 2018-04-06 PROCEDURE — 94003 VENT MGMT INPAT SUBQ DAY: CPT

## 2018-04-06 PROCEDURE — 99285 EMERGENCY DEPT VISIT HI MDM: CPT | Mod: 25

## 2018-04-06 PROCEDURE — 84100 ASSAY OF PHOSPHORUS: CPT

## 2018-04-06 PROCEDURE — 36415 COLL VENOUS BLD VENIPUNCTURE: CPT

## 2018-04-06 PROCEDURE — 80053 COMPREHEN METABOLIC PANEL: CPT

## 2018-04-06 PROCEDURE — 94640 AIRWAY INHALATION TREATMENT: CPT

## 2018-04-06 PROCEDURE — 87150 DNA/RNA AMPLIFIED PROBE: CPT

## 2018-04-06 PROCEDURE — 85027 COMPLETE CBC AUTOMATED: CPT

## 2018-04-06 PROCEDURE — 97163 PT EVAL HIGH COMPLEX 45 MIN: CPT

## 2018-04-06 PROCEDURE — 84300 ASSAY OF URINE SODIUM: CPT

## 2018-04-06 PROCEDURE — 82962 GLUCOSE BLOOD TEST: CPT

## 2018-04-06 PROCEDURE — 85730 THROMBOPLASTIN TIME PARTIAL: CPT

## 2018-04-06 PROCEDURE — 87040 BLOOD CULTURE FOR BACTERIA: CPT

## 2018-04-06 PROCEDURE — 87186 SC STD MICRODIL/AGAR DIL: CPT

## 2018-04-06 PROCEDURE — 96374 THER/PROPH/DIAG INJ IV PUSH: CPT

## 2018-04-06 PROCEDURE — 84480 ASSAY TRIIODOTHYRONINE (T3): CPT

## 2018-04-06 PROCEDURE — 84436 ASSAY OF TOTAL THYROXINE: CPT

## 2018-04-06 PROCEDURE — 71045 X-RAY EXAM CHEST 1 VIEW: CPT

## 2018-04-06 PROCEDURE — 80048 BASIC METABOLIC PNL TOTAL CA: CPT

## 2018-04-06 PROCEDURE — 87086 URINE CULTURE/COLONY COUNT: CPT

## 2018-04-06 PROCEDURE — 85610 PROTHROMBIN TIME: CPT

## 2018-04-06 PROCEDURE — 99239 HOSP IP/OBS DSCHRG MGMT >30: CPT

## 2018-04-06 PROCEDURE — T1013: CPT

## 2018-04-06 PROCEDURE — 81001 URINALYSIS AUTO W/SCOPE: CPT

## 2018-04-06 PROCEDURE — 94002 VENT MGMT INPAT INIT DAY: CPT

## 2018-04-06 PROCEDURE — 83605 ASSAY OF LACTIC ACID: CPT

## 2018-04-06 PROCEDURE — 87070 CULTURE OTHR SPECIMN AEROBIC: CPT

## 2018-04-06 PROCEDURE — 84443 ASSAY THYROID STIM HORMONE: CPT

## 2018-04-06 PROCEDURE — 83880 ASSAY OF NATRIURETIC PEPTIDE: CPT

## 2018-04-06 PROCEDURE — 93306 TTE W/DOPPLER COMPLETE: CPT

## 2018-04-06 RX ORDER — FUROSEMIDE 40 MG
1 TABLET ORAL
Qty: 0 | Refills: 0 | COMMUNITY

## 2018-04-06 RX ORDER — IPRATROPIUM/ALBUTEROL SULFATE 18-103MCG
3 AEROSOL WITH ADAPTER (GRAM) INHALATION
Qty: 0 | Refills: 0 | COMMUNITY
Start: 2018-04-06

## 2018-04-06 RX ORDER — MORPHINE SULFATE 50 MG/1
0.1 CAPSULE, EXTENDED RELEASE ORAL
Qty: 0 | Refills: 0 | COMMUNITY
Start: 2018-04-06

## 2018-04-06 RX ORDER — METOPROLOL TARTRATE 50 MG
1 TABLET ORAL
Qty: 0 | Refills: 0 | COMMUNITY

## 2018-04-06 RX ORDER — POTASSIUM CHLORIDE 20 MEQ
1 PACKET (EA) ORAL
Qty: 0 | Refills: 0 | COMMUNITY

## 2018-04-06 RX ORDER — MIRTAZAPINE 45 MG/1
1 TABLET, ORALLY DISINTEGRATING ORAL
Qty: 0 | Refills: 0 | COMMUNITY

## 2018-04-06 RX ORDER — NITROFURANTOIN MACROCRYSTAL 50 MG
1 CAPSULE ORAL
Qty: 0 | Refills: 0 | COMMUNITY

## 2018-04-06 RX ORDER — ASPIRIN/CALCIUM CARB/MAGNESIUM 324 MG
1 TABLET ORAL
Qty: 0 | Refills: 0 | COMMUNITY

## 2018-04-06 RX ORDER — MORPHINE SULFATE 50 MG/1
2 CAPSULE, EXTENDED RELEASE ORAL
Qty: 100 | Refills: 0 | OUTPATIENT
Start: 2018-04-06

## 2018-04-06 RX ORDER — MORPHINE SULFATE 50 MG/1
2 CAPSULE, EXTENDED RELEASE ORAL
Qty: 0 | Refills: 0 | COMMUNITY
Start: 2018-04-06

## 2018-04-06 RX ORDER — ASPIRIN/CALCIUM CARB/MAGNESIUM 324 MG
1 TABLET ORAL
Qty: 0 | Refills: 0 | COMMUNITY
Start: 2018-04-06

## 2018-04-06 RX ORDER — LEVOTHYROXINE SODIUM 125 MCG
0.5 TABLET ORAL
Qty: 0 | Refills: 0 | COMMUNITY

## 2018-04-06 RX ORDER — QUETIAPINE FUMARATE 200 MG/1
1 TABLET, FILM COATED ORAL
Qty: 0 | Refills: 0 | COMMUNITY

## 2018-04-06 RX ORDER — QUETIAPINE FUMARATE 200 MG/1
1 TABLET, FILM COATED ORAL
Qty: 0 | Refills: 0 | COMMUNITY
Start: 2018-04-06

## 2018-04-06 RX ORDER — DILTIAZEM HCL 120 MG
1 CAPSULE, EXT RELEASE 24 HR ORAL
Qty: 0 | Refills: 0 | COMMUNITY
Start: 2018-04-06

## 2018-04-06 RX ADMIN — Medication 1 APPLICATION(S): at 06:30

## 2018-04-06 RX ADMIN — MORPHINE SULFATE 2 MILLIGRAM(S): 50 CAPSULE, EXTENDED RELEASE ORAL at 00:31

## 2018-04-06 RX ADMIN — MORPHINE SULFATE 2 MILLIGRAM(S): 50 CAPSULE, EXTENDED RELEASE ORAL at 11:41

## 2018-04-06 RX ADMIN — MORPHINE SULFATE 2 MILLIGRAM(S): 50 CAPSULE, EXTENDED RELEASE ORAL at 17:16

## 2018-04-06 RX ADMIN — MORPHINE SULFATE 2 MILLIGRAM(S): 50 CAPSULE, EXTENDED RELEASE ORAL at 06:18

## 2018-04-06 RX ADMIN — Medication 1 APPLICATION(S): at 17:17

## 2018-04-06 RX ADMIN — MORPHINE SULFATE 2 MILLIGRAM(S): 50 CAPSULE, EXTENDED RELEASE ORAL at 16:08

## 2018-04-06 NOTE — DISCHARGE NOTE ADULT - MEDICATION SUMMARY - MEDICATIONS TO STOP TAKING
I will STOP taking the medications listed below when I get home from the hospital:    metoprolol 50 mg oral tablet  --  by mouth    Dulera 5 mcg-200 mcg/inh inhalation aerosol  --  inhaled    hydrALAZINE  -- 25 milligram(s) by mouth 2 times a day    mirtazapine 45 mg oral tablet  -- 1 tab(s) by mouth once a day (at bedtime)    Macrobid 100 mg oral capsule  -- 1 cap(s) by mouth 2 times a day    Lasix 20 mg oral tablet  -- 1 tab(s) by mouth once a day    chlor-valerie  -- 10 milligram(s) by mouth once a day

## 2018-04-06 NOTE — DISCHARGE NOTE ADULT - PATIENT PORTAL LINK FT
You can access the [facility] Patient Portal, offered by [facility], by registering with the following website: [email or web address]

## 2018-04-06 NOTE — PROGRESS NOTE ADULT - PROVIDER SPECIALTY LIST ADULT
Cardiology
Critical Care
Hospitalist
Infectious Disease
Infectious Disease
Nephrology
Hospitalist

## 2018-04-06 NOTE — PROGRESS NOTE ADULT - SUBJECTIVE AND OBJECTIVE BOX
CC: AMS/Sepsis    INTERVAL HPI/OVERNIGHT EVENTS: No acute events overnight. Not eating or drinking anything this am. Patient appears comfortable at present.    Vital Signs Last 24 Hrs  T(C): --  T(F): --  HR: --  BP: --  BP(mean): --  RR: --  SpO2: --    PHYSICAL EXAM:    GENERAL: NAD  NECK: soft, Supple  CHEST/LUNG: base crackles B/L  HEART: S1S2+, Irregular rate and rhythm; No murmurs  ABDOMEN: Soft, Nontender, Nondistended; Bowel sounds present  EXTREMITIES:   No clubbing, cyanosis, RUL - edematous, weeping,  fragile skin, skin tears+  NEURO: awake, alert, follows commands, Oriented x 1 - self only.      I&O's Detail                    CAPILLARY BLOOD GLUCOSE              MEDICATIONS  (STANDING):  aspirin  chewable 81 milliGRAM(s) Oral daily  BACItracin   Ointment 1 Application(s) Topical every 12 hours  diltiazem    Tablet 30 milliGRAM(s) Oral every 8 hours  levothyroxine 12.5 MICROGram(s) Oral daily  morphine  - Injectable 2 milliGRAM(s) IV Push every 6 hours  QUEtiapine 25 milliGRAM(s) Oral at bedtime    MEDICATIONS  (PRN):  acetaminophen   Tablet 650 milliGRAM(s) Oral every 6 hours PRN For Temp greater than 38 C (100.4 F)  ALBUTerol/ipratropium for Nebulization 3 milliLiter(s) Nebulizer every 6 hours PRN Shortness of Breath  levalbuterol Inhalation 0.63 milliGRAM(s) Inhalation every 6 hours PRN SOB  morphine  - Injectable 2 milliGRAM(s) IV Push every 2 hours PRN pain or dyspnea  morphine Concentrate 2 milliGRAM(s) Oral every 3 hours PRN Moderate Pain (4 - 6)      RADIOLOGY & ADDITIONAL TESTS:

## 2018-04-06 NOTE — PROGRESS NOTE ADULT - ASSESSMENT
89 y/o female pmhx of HTN, DM, Dementia, hypothyroidism admitted on 3/29 w/ septic shock 2/2 UTI and pneumonia  requiring short period of pressors, as well as acute hypoxic respiratory failure requiring intubation. Weaned off pressors and started on midodrine however she  had episodes of bradycardia on midodrine and it was discontinued. She was extubated on 3/31 and has done well on NC. She also had GRAY< hypernatremia which has improved with iv fluid resuscitation. transferred out of MICU 3/31.       Problem/Plan - 1:  ·  Problem: Septic shock.  Plan: Resolved, HD stable off pressors.   One bottle of blood cultures growing GPC, likely containment. Repeat cultures negative.  completed  zosyn for PNA/ UTI -       Problem/Plan - 2:  ·  Problem: Encephalopathy acute. Plan: delirium on Dementia  Possibly 2/2 toxic metabolic encephalopathy - as per Grandson was ambulating with walker one week PTA  Ct supportive care.  Discussion with Jeanine Yeh (HCP), Palliative care consult, DNR obtained. Per jeanine Yeh, granddaughter (Cony) is not making decisions for patient.      Problem/Plan - 3:  ·  Problem: GRAY (acute kidney injury). Plan: resolved with IVF hydration, monitor BMP.        Problem/Plan - 4:  ·  Problem: UTI (urinary tract infection). Plan: completed 7day course of zosyn     Problem/Plan - 5:  ·  Problem: Lobar pneumonia. likely 2/2 aspiration event - gram negative bacteria Plan: completed 7day course of Zosyn  Chest PT  Duonebs q6hr.     Problem/Plan - 6:  Problem: New onset AFIBB. Plan:  Cardio consult, increase Cardizem to 30 mg QID, patient is not a candidate for AC.      Problem/Plan - 7:  ·  Problem: Dementia with behavioral disturbance, unspecified dementia type. Plan: c/w Seroquel. Palliative following. Hospice eval     Problem/Plan - 8:  ·  Problem: Pleural Effusion.  Plan: IV fluids dc'd    Problem/Plan-9:  Problem: Dysphagia. Plan: Nectar thickened liquids. Aspiration precautions.     Dispo: Discussion today with HCP, sarahi Proctor, [phone]. He states he understands that his mother will not improve and would like Hospice services. As per evaluation patient would be in patient appropriate. Discussed with Palliative Care, Ai Ontiverosn and discharge planner. Awaiting Hospice input. Midline placed for administration of IV Morphine. Patient remains encephalopathic and unable to participate in care.

## 2018-04-06 NOTE — DISCHARGE NOTE ADULT - HOSPITAL COURSE
89 y/o female pmhx of HTN, DM, Dementia, hypothyroidism admitted on 3/29 w/ septic shock 2/2 UTI and pneumonia  requiring short period of pressors, as well as acute hypoxic respiratory failure requiring intubation. Weaned off pressors and started on midodrine however she  had episodes of bradycardia on midodrine and it was discontinued. She was extubated on 3/31. She also had GRAY< hypernatremia which has improved with iv fluid resuscitation. transferred out of MICU 3/31.      patient treated for pneumonia and uti but mental status did not improve [facility] for [facility]    time spent on dc 32 minutes

## 2018-04-06 NOTE — DISCHARGE NOTE ADULT - CARE PLAN
Principal Discharge DX:	Septic shock  Goal:	resolved, treated with abx  Assessment and plan of treatment:	dc to inpatient hospice  Secondary Diagnosis:	Palliative care encounter  Goal:	hospice  Secondary Diagnosis:	UTI (urinary tract infection)  Goal:	treated  Secondary Diagnosis:	Lobar pneumonia  Secondary Diagnosis:	Hypothyroidism

## 2018-04-06 NOTE — ADVANCED PRACTICE NURSE CONSULT - ASSESSMENT
Patient accepted for inpatient hospice, transportation arranged by case management for 5 pm today.  RN to call to give report to [facility], please leave all lines in place.

## 2020-07-02 NOTE — CONSULT NOTE ADULT - PROBLEM/RECOMMENDATION-3
[facility] lab result A1c and Micro from 6/3/20 posted/scaned to patient's chart: A1c: 10.1  Was this addressed at Dequ[facility] Rommel [address] [address] 6/23/20? Let us know if we need to f/u with the patient.
DISPLAY PLAN FREE TEXT

## 2023-01-04 NOTE — PATIENT PROFILE ADULT. - NS PRO LAST MENSTRUAL DATE
unknown Cyclophosphamide Counseling:  I discussed with the patient the risks of cyclophosphamide including but not limited to hair loss, hormonal abnormalities, decreased fertility, abdominal pain, diarrhea, nausea and vomiting, bone marrow suppression and infection. The patient understands that monitoring is required while taking this medication.

## 2023-05-26 NOTE — ED ADULT NURSE NOTE - NS ED NURSE REPORT GIVEN TO FT
You can access the FollowMyHealth Patient Portal offered by [facility] by registering at the following website: [email or web address]. By joining [employer]’s FollowMyHealth portal, you will also be able to view your health information using other applications (apps) compatible with our system.
BETTY Weber

## 2023-09-06 NOTE — ED ADULT NURSE NOTE - PERIPHERAL VASCULAR WDL
Pulses equal bilaterally, no edema present. Partially impaired: cannot see medication labels or newsprint, but can see obstacles in path, and the surrounding layout; can count fingers at arm's length

## 2024-09-27 NOTE — CONSULT NOTE ADULT - PROBLEM SELECTOR RECOMMENDATION 9
[No Acute Distress] : no acute distress
increase cardizem 30 Q6.   not candidate for long term anticoagulation .
[Well Nourished] : well nourished
[Well Developed] : well developed
[Well-Appearing] : well-appearing
Likely related to severe hypovolemia/dehydration secondary to poor PO intake. Calculated free water deficit: 5 L. Continue D5W infusion @ 80cc/hr. Trend BMP q 6 hours to follow serum Na. Caution with overcorrection given risk of cerebral edema and seizure- max correction of 10 mEQ in initial 24 hours. F/u urine studies. Continue to monitor mental status. Nephrology following.
[Normal Sclera/Conjunctiva] : normal sclera/conjunctiva
[PERRL] : pupils equal round and reactive to light
- advanced. poor prognosis
[EOMI] : extraocular movements intact
[Normal Outer Ear/Nose] : the outer ears and nose were normal in appearance
[Normal Oropharynx] : the oropharynx was normal
[No JVD] : no jugular venous distention
[Supple] : supple
[No Respiratory Distress] : no respiratory distress 
[No Accessory Muscle Use] : no accessory muscle use
[Clear to Auscultation] : lungs were clear to auscultation bilaterally
[Normal Rate] : normal rate 
[Regular Rhythm] : with a regular rhythm
[Normal S1, S2] : normal S1 and S2
[No Murmur] : no murmur heard
[No Carotid Bruits] : no carotid bruits
[No Abdominal Bruit] : a ~M bruit was not heard ~T in the abdomen
[No Edema] : there was no peripheral edema
[No Palpable Aorta] : no palpable aorta
[No Extremity Clubbing/Cyanosis] : no extremity clubbing/cyanosis
[Soft] : abdomen soft
[Non Tender] : non-tender
[Non-distended] : non-distended
[No Masses] : no abdominal mass palpated
[No HSM] : no HSM
[Normal Bowel Sounds] : normal bowel sounds
[No CVA Tenderness] : no CVA  tenderness
[No Spinal Tenderness] : no spinal tenderness
[No Joint Swelling] : no joint swelling
[Grossly Normal Strength/Tone] : grossly normal strength/tone
[Normal Gait] : normal gait
[Deep Tendon Reflexes (DTR)] : deep tendon reflexes were 2+ and symmetric
[Normal Insight/Judgement] : insight and judgment were intact
[de-identified] : white spots rash on upper back